# Patient Record
Sex: MALE | Race: WHITE | Employment: FULL TIME | URBAN - METROPOLITAN AREA
[De-identification: names, ages, dates, MRNs, and addresses within clinical notes are randomized per-mention and may not be internally consistent; named-entity substitution may affect disease eponyms.]

---

## 2017-01-24 RX ORDER — IPRATROPIUM/ALBUTEROL SULFATE 20-100 MCG
MIST INHALER (GRAM) INHALATION
Qty: 12 G | Refills: 2 | OUTPATIENT
Start: 2017-01-24

## 2017-01-26 ENCOUNTER — OFFICE VISIT (OUTPATIENT)
Dept: FAMILY MEDICINE CLINIC | Age: 67
End: 2017-01-26

## 2017-01-26 VITALS
HEIGHT: 65 IN | HEART RATE: 78 BPM | BODY MASS INDEX: 27.12 KG/M2 | DIASTOLIC BLOOD PRESSURE: 84 MMHG | SYSTOLIC BLOOD PRESSURE: 126 MMHG | WEIGHT: 162.8 LBS | RESPIRATION RATE: 18 BRPM | TEMPERATURE: 98.7 F

## 2017-01-26 DIAGNOSIS — M54.50 CHRONIC BILATERAL LOW BACK PAIN WITHOUT SCIATICA: ICD-10-CM

## 2017-01-26 DIAGNOSIS — I10 ESSENTIAL HYPERTENSION: ICD-10-CM

## 2017-01-26 DIAGNOSIS — J20.8 ACUTE BRONCHITIS DUE TO OTHER SPECIFIED ORGANISMS: Primary | ICD-10-CM

## 2017-01-26 DIAGNOSIS — G89.29 CHRONIC BILATERAL LOW BACK PAIN WITHOUT SCIATICA: ICD-10-CM

## 2017-01-26 DIAGNOSIS — J43.1 PANLOBULAR EMPHYSEMA (HCC): ICD-10-CM

## 2017-01-26 DIAGNOSIS — Z72.0 TOBACCO ABUSE: ICD-10-CM

## 2017-01-26 PROCEDURE — 99214 OFFICE O/P EST MOD 30 MIN: CPT | Performed by: FAMILY MEDICINE

## 2017-01-26 RX ORDER — CEFDINIR 300 MG/1
300 CAPSULE ORAL 2 TIMES DAILY
Qty: 20 CAPSULE | Refills: 0 | Status: SHIPPED | OUTPATIENT
Start: 2017-01-26 | End: 2017-02-05

## 2017-01-26 RX ORDER — METHYLPREDNISOLONE 4 MG/1
TABLET ORAL
Qty: 1 KIT | Refills: 0 | Status: SHIPPED | OUTPATIENT
Start: 2017-01-26 | End: 2017-01-27 | Stop reason: SDUPTHER

## 2017-01-26 RX ORDER — AMLODIPINE BESYLATE AND ATORVASTATIN CALCIUM 10; 20 MG/1; MG/1
TABLET, FILM COATED ORAL
Qty: 90 TABLET | Refills: 3 | Status: SHIPPED | OUTPATIENT
Start: 2017-01-26 | End: 2017-02-23 | Stop reason: SDUPTHER

## 2017-01-26 RX ORDER — CEFDINIR 300 MG/1
300 CAPSULE ORAL 2 TIMES DAILY
Qty: 20 CAPSULE | Refills: 0 | Status: SHIPPED | OUTPATIENT
Start: 2017-01-26 | End: 2017-01-26

## 2017-01-26 RX ORDER — ALBUTEROL SULFATE 2.5 MG/3ML
2.5 SOLUTION RESPIRATORY (INHALATION) 4 TIMES DAILY
Qty: 120 EACH | Refills: 3 | Status: SHIPPED | OUTPATIENT
Start: 2017-01-26 | End: 2017-02-03 | Stop reason: SDUPTHER

## 2017-01-26 ASSESSMENT — PATIENT HEALTH QUESTIONNAIRE - PHQ9
2. FEELING DOWN, DEPRESSED OR HOPELESS: 1
SUM OF ALL RESPONSES TO PHQ QUESTIONS 1-9: 2
1. LITTLE INTEREST OR PLEASURE IN DOING THINGS: 1
SUM OF ALL RESPONSES TO PHQ9 QUESTIONS 1 & 2: 2

## 2017-01-27 RX ORDER — METHYLPREDNISOLONE 4 MG/1
TABLET ORAL
Qty: 1 KIT | Refills: 0 | Status: SHIPPED | OUTPATIENT
Start: 2017-01-27 | End: 2017-02-02

## 2017-02-03 ENCOUNTER — TELEPHONE (OUTPATIENT)
Dept: FAMILY MEDICINE CLINIC | Age: 67
End: 2017-02-03

## 2017-02-03 RX ORDER — ALBUTEROL SULFATE 2.5 MG/3ML
2.5 SOLUTION RESPIRATORY (INHALATION) 4 TIMES DAILY
Qty: 360 EACH | Refills: 3 | Status: SHIPPED | OUTPATIENT
Start: 2017-02-03

## 2017-02-23 RX ORDER — AMLODIPINE BESYLATE AND ATORVASTATIN CALCIUM 10; 20 MG/1; MG/1
TABLET, FILM COATED ORAL
Qty: 90 TABLET | Refills: 3 | Status: SHIPPED | OUTPATIENT
Start: 2017-02-23

## 2017-06-06 ENCOUNTER — OFFICE VISIT (OUTPATIENT)
Dept: FAMILY MEDICINE CLINIC | Age: 67
End: 2017-06-06

## 2017-06-06 ENCOUNTER — HOSPITAL ENCOUNTER (OUTPATIENT)
Dept: GENERAL RADIOLOGY | Age: 67
Discharge: HOME | End: 2017-06-06
Payer: COMMERCIAL

## 2017-06-06 VITALS
WEIGHT: 164 LBS | RESPIRATION RATE: 16 BRPM | HEIGHT: 65 IN | DIASTOLIC BLOOD PRESSURE: 74 MMHG | TEMPERATURE: 98 F | BODY MASS INDEX: 27.32 KG/M2 | HEART RATE: 80 BPM | SYSTOLIC BLOOD PRESSURE: 136 MMHG

## 2017-06-06 DIAGNOSIS — R07.81 RIB PAIN ON RIGHT SIDE: Primary | ICD-10-CM

## 2017-06-06 DIAGNOSIS — R05.9 COUGH: ICD-10-CM

## 2017-06-06 DIAGNOSIS — Z23 NEED FOR PROPHYLACTIC VACCINATION AGAINST STREPTOCOCCUS PNEUMONIAE (PNEUMOCOCCUS): ICD-10-CM

## 2017-06-06 DIAGNOSIS — R07.81 RIB PAIN ON RIGHT SIDE: ICD-10-CM

## 2017-06-06 PROCEDURE — 90732 PPSV23 VACC 2 YRS+ SUBQ/IM: CPT | Performed by: NURSE PRACTITIONER

## 2017-06-06 PROCEDURE — 99213 OFFICE O/P EST LOW 20 MIN: CPT | Performed by: NURSE PRACTITIONER

## 2017-06-06 PROCEDURE — 90471 IMMUNIZATION ADMIN: CPT | Performed by: NURSE PRACTITIONER

## 2017-06-06 PROCEDURE — 71020 XR CHEST STANDARD TWO VW: CPT

## 2017-06-06 ASSESSMENT — ENCOUNTER SYMPTOMS
SHORTNESS OF BREATH: 1
COUGH: 1

## 2017-09-08 ENCOUNTER — OFFICE VISIT (OUTPATIENT)
Dept: FAMILY MEDICINE CLINIC | Age: 67
End: 2017-09-08

## 2017-09-08 VITALS
WEIGHT: 165 LBS | TEMPERATURE: 96.5 F | HEART RATE: 64 BPM | DIASTOLIC BLOOD PRESSURE: 86 MMHG | BODY MASS INDEX: 27.49 KG/M2 | RESPIRATION RATE: 12 BRPM | SYSTOLIC BLOOD PRESSURE: 136 MMHG | HEIGHT: 65 IN

## 2017-09-08 DIAGNOSIS — I10 ESSENTIAL HYPERTENSION: ICD-10-CM

## 2017-09-08 DIAGNOSIS — G89.29 CHRONIC BILATERAL LOW BACK PAIN WITHOUT SCIATICA: ICD-10-CM

## 2017-09-08 DIAGNOSIS — L98.9 SKIN LESION: ICD-10-CM

## 2017-09-08 DIAGNOSIS — Z72.0 TOBACCO ABUSE: ICD-10-CM

## 2017-09-08 DIAGNOSIS — J43.1 PANLOBULAR EMPHYSEMA (HCC): Primary | ICD-10-CM

## 2017-09-08 DIAGNOSIS — M54.50 CHRONIC BILATERAL LOW BACK PAIN WITHOUT SCIATICA: ICD-10-CM

## 2017-09-08 PROCEDURE — 99214 OFFICE O/P EST MOD 30 MIN: CPT | Performed by: FAMILY MEDICINE

## 2017-12-19 ENCOUNTER — OFFICE VISIT (OUTPATIENT)
Dept: FAMILY MEDICINE CLINIC | Age: 67
End: 2017-12-19

## 2017-12-19 ENCOUNTER — HOSPITAL ENCOUNTER (OUTPATIENT)
Dept: GENERAL RADIOLOGY | Age: 67
Discharge: HOME | End: 2017-12-19
Payer: COMMERCIAL

## 2017-12-19 VITALS
WEIGHT: 162 LBS | HEART RATE: 88 BPM | DIASTOLIC BLOOD PRESSURE: 84 MMHG | RESPIRATION RATE: 18 BRPM | TEMPERATURE: 99.8 F | BODY MASS INDEX: 26.96 KG/M2 | SYSTOLIC BLOOD PRESSURE: 126 MMHG

## 2017-12-19 DIAGNOSIS — J20.9 ACUTE BRONCHITIS, UNSPECIFIED ORGANISM: ICD-10-CM

## 2017-12-19 DIAGNOSIS — J44.9 CHRONIC OBSTRUCTIVE PULMONARY DISEASE, UNSPECIFIED COPD TYPE (HCC): ICD-10-CM

## 2017-12-19 DIAGNOSIS — J44.1 COPD WITH ACUTE EXACERBATION (HCC): ICD-10-CM

## 2017-12-19 DIAGNOSIS — J44.1 COPD WITH ACUTE EXACERBATION (HCC): Primary | ICD-10-CM

## 2017-12-19 PROCEDURE — 71020 XR CHEST STANDARD TWO VW: CPT

## 2017-12-19 PROCEDURE — 99213 OFFICE O/P EST LOW 20 MIN: CPT | Performed by: INTERNAL MEDICINE

## 2017-12-19 RX ORDER — AZITHROMYCIN 250 MG/1
TABLET, FILM COATED ORAL
Qty: 1 PACKET | Refills: 0 | Status: SHIPPED | OUTPATIENT
Start: 2017-12-19

## 2017-12-19 RX ORDER — FLUTICASONE PROPIONATE 110 UG/1
AEROSOL, METERED RESPIRATORY (INHALATION)
Qty: 1 INHALER | Refills: 3 | Status: SHIPPED | OUTPATIENT
Start: 2017-12-19

## 2017-12-19 RX ORDER — PREDNISONE 20 MG/1
40 TABLET ORAL DAILY
Qty: 10 TABLET | Refills: 1 | Status: SHIPPED | OUTPATIENT
Start: 2017-12-19 | End: 2017-12-24

## 2017-12-19 ASSESSMENT — ENCOUNTER SYMPTOMS
WHEEZING: 0
COUGH: 0
TROUBLE SWALLOWING: 0
EYE PAIN: 0
VOICE CHANGE: 0
CONSTIPATION: 0
SORE THROAT: 0
SHORTNESS OF BREATH: 0
ABDOMINAL DISTENTION: 0
RHINORRHEA: 0
EYE ITCHING: 0
DIARRHEA: 0
ABDOMINAL PAIN: 0
COLOR CHANGE: 0
PHOTOPHOBIA: 0
BLOOD IN STOOL: 0
NAUSEA: 0
BACK PAIN: 0
SINUS PRESSURE: 1
EYE DISCHARGE: 0
EYE REDNESS: 0

## 2017-12-19 NOTE — PROGRESS NOTES
Subjective:      Patient ID: Toni Hammer is a 74 y.o. male    Cough x 2 weeks    HPI    Presents with 2 weeks of cough productive of thick green sputum. Assoc chest pain (both sides of the chest) and back pain with coughing and breathing. Pain is dull and rated 2/10, non radiating. Assoc nasal congestion, no sore throat or ear pain. Assoc low grade fever (99.8F today) and chills, assoc SOB with exertion, assoc increased wheezing. Improved with albuterol inhaler and nebulizer. Increased need for inhaler use. No help from coricidin and Nyquil. Same symptoms in sick co workers. Got flu shot on 11/21/17. Past Medical History:   Diagnosis Date    COPD (chronic obstructive pulmonary disease) (HCC)     Herpes zoster with other specified complications(053.79)     History of tobacco abuse     Hyperlipidemia     Hypertension      Past Surgical History:   Procedure Laterality Date    COLONOSCOPY  05/06/16    Celina Styles MD     Social History     Social History    Marital status:      Spouse name: N/A    Number of children: N/A    Years of education: N/A     Occupational History    Not on file. Social History Main Topics    Smoking status: Current Every Day Smoker    Smokeless tobacco: Never Used    Alcohol use Not on file    Drug use: Unknown    Sexual activity: Not on file     Other Topics Concern    Not on file     Social History Narrative    No narrative on file     Family History   Problem Relation Age of Onset    Stroke Mother     Alzheimer's Disease Father      Allergies:  Review of patient's allergies indicates no known allergies.   Patient Active Problem List   Diagnosis    History of tobacco abuse    Herpes zoster with other specified complications    Tobacco abuse    Low back pain    Panlobular emphysema ([facility] [address]    Essential hypertension     Current Outpatient Prescriptions on File Prior to Visit   Medication Sig Dispense Refill    ipratropium (ATROVENT) 0.02 % nebulizer solution Take 2.5 mLs by nebulization 4 times daily ninety day supplies 100 mL 5    albuterol-ipratropium (COMBIVENT RESPIMAT)  MCG/ACT AERS inhaler INHALE 1 PUFF INTO THE LUNGS EVERY 6 HOURS 3 Inhaler 3    amLODIPine-atorvastatatin (CADUET) 10-20 MG per tablet TAKE 1 TABLET DAILY 90 tablet 3    albuterol (PROVENTIL) (2.5 MG/3ML) 0.083% nebulizer solution Take 3 mLs by nebulization 4 times daily 360 each 3    aspirin 81 MG EC tablet Take 81 mg by mouth daily.  Cyanocobalamin (VITAMIN B-12 CR PO) Take  by mouth. No current facility-administered medications on file prior to visit. Review of Systems   Constitutional: Negative for activity change, appetite change, chills, diaphoresis, fatigue, fever and unexpected weight change. HENT: Positive for congestion and sinus pressure. Negative for dental problem, drooling, ear discharge, ear pain, hearing loss, mouth sores, rhinorrhea, sneezing, sore throat, trouble swallowing and voice change. Eyes: Negative for photophobia, pain, discharge, redness and itching. Respiratory: Negative for cough, shortness of breath and wheezing. Gastrointestinal: Negative for abdominal distention, abdominal pain, blood in stool, constipation, diarrhea and nausea. Endocrine: Negative for cold intolerance, heat intolerance, polydipsia and polyuria. Genitourinary: Negative for decreased urine volume, difficulty urinating, dysuria, flank pain, frequency, hematuria and urgency. Musculoskeletal: Negative for arthralgias, back pain and joint swelling. Skin: Negative for color change. Allergic/Immunologic: Negative for environmental allergies and food allergies. Neurological: Negative for dizziness, seizures, syncope, weakness, light-headedness, numbness and headaches. Psychiatric/Behavioral: Negative for agitation, decreased concentration, dysphoric mood and hallucinations. The patient is not nervous/anxious.         Objective:   /84 Pulse 88   Temp 99.8 °F (37.7 °C) (Temporal)   Resp 18   Wt 162 lb (73.5 kg)   BMI 26.96 kg/m²     Physical Exam   Constitutional: He is oriented to person, place, and time. He appears well-developed and well-nourished. No distress. HENT:   Head: Normocephalic and atraumatic. Right Ear: External ear normal.   Left Ear: External ear normal.   Mouth/Throat: Oropharynx is clear and moist. No oropharyngeal exudate. Left submandibular lymphadenopathy, non tender    No TM erythema, no sinus TTP   Eyes: Pupils are equal, round, and reactive to light. Right eye exhibits no discharge. Left eye exhibits no discharge. Cardiovascular: Normal rate, regular rhythm, S1 normal, S2 normal and normal heart sounds. Pulmonary/Chest: Effort normal and breath sounds normal. No accessory muscle usage. No tachypnea. No respiratory distress. He has no wheezes. He has no rales. Abdominal: Soft. Bowel sounds are normal. There is no tenderness. Musculoskeletal: He exhibits no edema. Lymphadenopathy:     He has no cervical adenopathy. Neurological: He is alert and oriented to person, place, and time. Skin: He is not diaphoretic. Assessment:      1. COPD with acute exacerbation (HCC)  XR CHEST STANDARD (2 VW)    azithromycin (ZITHROMAX) 250 MG tablet    predniSONE (DELTASONE) 20 MG tablet    Respiratory Culture   2. Acute bronchitis, unspecified organism  XR CHEST STANDARD (2 VW)    azithromycin (ZITHROMAX) 250 MG tablet    Respiratory Culture   3.  Chronic obstructive pulmonary disease, unspecified COPD type ([address]  fluticasone (FLOVENT HFA) 110 MCG/ACT inhaler         Plan:      Orders Placed This Encounter   Procedures    Respiratory Culture     Standing Status:   Future     Number of Occurrences:   1     Standing Expiration Date:   12/19/2018    XR CHEST STANDARD (2 VW)     Standing Status:   Future     Number of Occurrences:   1     Standing Expiration Date:   12/19/2018     Order Specific Question:   Reason

## 2017-12-22 LAB
CULTURE, RESPIRATORY: NORMAL
GRAM STAIN RESULT: NORMAL

## 2019-03-05 ENCOUNTER — TELEPHONE (OUTPATIENT)
Dept: FAMILY MEDICINE CLINIC | Age: 69
End: 2019-03-05

## 2023-11-15 ENCOUNTER — APPOINTMENT (OUTPATIENT)
Dept: CARDIOLOGY | Facility: HOSPITAL | Age: 73
End: 2023-11-15
Payer: MEDICARE

## 2023-11-15 ENCOUNTER — APPOINTMENT (OUTPATIENT)
Dept: RADIOLOGY | Facility: HOSPITAL | Age: 73
End: 2023-11-15
Payer: MEDICARE

## 2023-11-15 ENCOUNTER — HOSPITAL ENCOUNTER (OUTPATIENT)
Facility: HOSPITAL | Age: 73
Setting detail: OBSERVATION
Discharge: HOME | End: 2023-11-16
Attending: STUDENT IN AN ORGANIZED HEALTH CARE EDUCATION/TRAINING PROGRAM | Admitting: HOSPITALIST
Payer: MEDICARE

## 2023-11-15 ENCOUNTER — APPOINTMENT (OUTPATIENT)
Dept: NEUROLOGY | Facility: HOSPITAL | Age: 73
End: 2023-11-15
Payer: MEDICARE

## 2023-11-15 DIAGNOSIS — J96.01 ACUTE RESPIRATORY FAILURE WITH HYPOXIA (MULTI): ICD-10-CM

## 2023-11-15 DIAGNOSIS — R55 SYNCOPE, UNSPECIFIED SYNCOPE TYPE: ICD-10-CM

## 2023-11-15 DIAGNOSIS — J42 CHRONIC BRONCHITIS, UNSPECIFIED CHRONIC BRONCHITIS TYPE (MULTI): ICD-10-CM

## 2023-11-15 DIAGNOSIS — R56.9 SEIZURE (MULTI): Primary | ICD-10-CM

## 2023-11-15 LAB
ALBUMIN SERPL BCP-MCNC: 4.3 G/DL (ref 3.4–5)
ALP SERPL-CCNC: 114 U/L (ref 33–136)
ALT SERPL W P-5'-P-CCNC: 16 U/L (ref 10–52)
AMPHETAMINES UR QL SCN: ABNORMAL
ANION GAP SERPL CALC-SCNC: 18 MMOL/L (ref 10–20)
APAP SERPL-MCNC: <10 UG/ML
APPEARANCE UR: CLEAR
APTT PPP: 28 SECONDS (ref 27–38)
AST SERPL W P-5'-P-CCNC: 21 U/L (ref 9–39)
BARBITURATES UR QL SCN: ABNORMAL
BASOPHILS # BLD AUTO: 0.09 X10*3/UL (ref 0–0.1)
BASOPHILS NFR BLD AUTO: 0.8 %
BENZODIAZ UR QL SCN: ABNORMAL
BILIRUB SERPL-MCNC: 0.8 MG/DL (ref 0–1.2)
BILIRUB UR STRIP.AUTO-MCNC: NEGATIVE MG/DL
BUN SERPL-MCNC: 14 MG/DL (ref 6–23)
BZE UR QL SCN: ABNORMAL
CALCIUM SERPL-MCNC: 10.3 MG/DL (ref 8.6–10.3)
CANNABINOIDS UR QL SCN: ABNORMAL
CARDIAC TROPONIN I PNL SERPL HS: 6 NG/L (ref 0–20)
CHLORIDE SERPL-SCNC: 92 MMOL/L (ref 98–107)
CK MB CFR SERPL CALC: 1 %MB OF CK
CK MB SERPL-CCNC: 2.7 NG/ML
CK SERPL-CCNC: 220 U/L (ref 0–325)
CO2 SERPL-SCNC: 23 MMOL/L (ref 21–32)
COLOR UR: YELLOW
CREAT SERPL-MCNC: 1.25 MG/DL (ref 0.5–1.3)
EOSINOPHIL # BLD AUTO: 0.26 X10*3/UL (ref 0–0.4)
EOSINOPHIL NFR BLD AUTO: 2.2 %
ERYTHROCYTE [DISTWIDTH] IN BLOOD BY AUTOMATED COUNT: 13.7 % (ref 11.5–14.5)
ETHANOL SERPL-MCNC: <10 MG/DL
FENTANYL+NORFENTANYL UR QL SCN: ABNORMAL
GFR SERPL CREATININE-BSD FRML MDRD: 62 ML/MIN/1.73M*2
GLUCOSE SERPL-MCNC: 129 MG/DL (ref 74–99)
GLUCOSE UR STRIP.AUTO-MCNC: NEGATIVE MG/DL
HCT VFR BLD AUTO: 42.4 % (ref 41–52)
HGB BLD-MCNC: 14.8 G/DL (ref 13.5–17.5)
HOLD SPECIMEN: NORMAL
IMM GRANULOCYTES # BLD AUTO: 0.07 X10*3/UL (ref 0–0.5)
IMM GRANULOCYTES NFR BLD AUTO: 0.6 % (ref 0–0.9)
INR PPP: 1 (ref 0.9–1.1)
KETONES UR STRIP.AUTO-MCNC: NEGATIVE MG/DL
LACTATE SERPL-SCNC: 0.9 MMOL/L (ref 0.4–2)
LEUKOCYTE ESTERASE UR QL STRIP.AUTO: NEGATIVE
LEVETIRACETAM SERPL-MCNC: <2 UG/ML (ref 10–40)
LYMPHOCYTES # BLD AUTO: 1.28 X10*3/UL (ref 0.8–3)
LYMPHOCYTES NFR BLD AUTO: 10.8 %
MAGNESIUM SERPL-MCNC: 1.89 MG/DL (ref 1.6–2.4)
MCH RBC QN AUTO: 29.7 PG (ref 26–34)
MCHC RBC AUTO-ENTMCNC: 34.9 G/DL (ref 32–36)
MCV RBC AUTO: 85 FL (ref 80–100)
MONOCYTES # BLD AUTO: 1.13 X10*3/UL (ref 0.05–0.8)
MONOCYTES NFR BLD AUTO: 9.5 %
NEUTROPHILS # BLD AUTO: 9.01 X10*3/UL (ref 1.6–5.5)
NEUTROPHILS NFR BLD AUTO: 76.1 %
NITRITE UR QL STRIP.AUTO: NEGATIVE
NRBC BLD-RTO: 0 /100 WBCS (ref 0–0)
OPIATES UR QL SCN: ABNORMAL
OXYCODONE+OXYMORPHONE UR QL SCN: ABNORMAL
PCP UR QL SCN: ABNORMAL
PH UR STRIP.AUTO: 7 [PH]
PHOSPHATE SERPL-MCNC: 1.8 MG/DL (ref 2.5–4.9)
PLATELET # BLD AUTO: 446 X10*3/UL (ref 150–450)
POTASSIUM SERPL-SCNC: 3.6 MMOL/L (ref 3.5–5.3)
PROT SERPL-MCNC: 7.1 G/DL (ref 6.4–8.2)
PROT UR STRIP.AUTO-MCNC: NEGATIVE MG/DL
PROTHROMBIN TIME: 10.7 SECONDS (ref 9.8–12.8)
RBC # BLD AUTO: 4.99 X10*6/UL (ref 4.5–5.9)
RBC # UR STRIP.AUTO: NEGATIVE /UL
SALICYLATES SERPL-MCNC: <3 MG/DL
SODIUM SERPL-SCNC: 129 MMOL/L (ref 136–145)
SP GR UR STRIP.AUTO: 1.01
UROBILINOGEN UR STRIP.AUTO-MCNC: <2 MG/DL
WBC # BLD AUTO: 11.8 X10*3/UL (ref 4.4–11.3)

## 2023-11-15 PROCEDURE — 85025 COMPLETE CBC W/AUTO DIFF WBC: CPT | Performed by: STUDENT IN AN ORGANIZED HEALTH CARE EDUCATION/TRAINING PROGRAM

## 2023-11-15 PROCEDURE — 96361 HYDRATE IV INFUSION ADD-ON: CPT | Performed by: STUDENT IN AN ORGANIZED HEALTH CARE EDUCATION/TRAINING PROGRAM

## 2023-11-15 PROCEDURE — 82550 ASSAY OF CK (CPK): CPT

## 2023-11-15 PROCEDURE — 83605 ASSAY OF LACTIC ACID: CPT

## 2023-11-15 PROCEDURE — 95816 EEG AWAKE AND DROWSY: CPT | Performed by: STUDENT IN AN ORGANIZED HEALTH CARE EDUCATION/TRAINING PROGRAM

## 2023-11-15 PROCEDURE — 94640 AIRWAY INHALATION TREATMENT: CPT

## 2023-11-15 PROCEDURE — 2500000001 HC RX 250 WO HCPCS SELF ADMINISTERED DRUGS (ALT 637 FOR MEDICARE OP): Performed by: HOSPITALIST

## 2023-11-15 PROCEDURE — 70450 CT HEAD/BRAIN W/O DYE: CPT | Performed by: RADIOLOGY

## 2023-11-15 PROCEDURE — G0378 HOSPITAL OBSERVATION PER HR: HCPCS

## 2023-11-15 PROCEDURE — 96374 THER/PROPH/DIAG INJ IV PUSH: CPT | Performed by: STUDENT IN AN ORGANIZED HEALTH CARE EDUCATION/TRAINING PROGRAM

## 2023-11-15 PROCEDURE — 82553 CREATINE MB FRACTION: CPT | Mod: ELYLAB

## 2023-11-15 PROCEDURE — 80329 ANALGESICS NON-OPIOID 1 OR 2: CPT | Performed by: STUDENT IN AN ORGANIZED HEALTH CARE EDUCATION/TRAINING PROGRAM

## 2023-11-15 PROCEDURE — 80177 DRUG SCRN QUAN LEVETIRACETAM: CPT | Mod: ELYLAB | Performed by: STUDENT IN AN ORGANIZED HEALTH CARE EDUCATION/TRAINING PROGRAM

## 2023-11-15 PROCEDURE — 36415 COLL VENOUS BLD VENIPUNCTURE: CPT | Performed by: STUDENT IN AN ORGANIZED HEALTH CARE EDUCATION/TRAINING PROGRAM

## 2023-11-15 PROCEDURE — 2500000001 HC RX 250 WO HCPCS SELF ADMINISTERED DRUGS (ALT 637 FOR MEDICARE OP): Performed by: STUDENT IN AN ORGANIZED HEALTH CARE EDUCATION/TRAINING PROGRAM

## 2023-11-15 PROCEDURE — 36415 COLL VENOUS BLD VENIPUNCTURE: CPT

## 2023-11-15 PROCEDURE — 81003 URINALYSIS AUTO W/O SCOPE: CPT | Performed by: STUDENT IN AN ORGANIZED HEALTH CARE EDUCATION/TRAINING PROGRAM

## 2023-11-15 PROCEDURE — 84100 ASSAY OF PHOSPHORUS: CPT | Performed by: STUDENT IN AN ORGANIZED HEALTH CARE EDUCATION/TRAINING PROGRAM

## 2023-11-15 PROCEDURE — 93005 ELECTROCARDIOGRAM TRACING: CPT

## 2023-11-15 PROCEDURE — 80053 COMPREHEN METABOLIC PANEL: CPT | Performed by: STUDENT IN AN ORGANIZED HEALTH CARE EDUCATION/TRAINING PROGRAM

## 2023-11-15 PROCEDURE — 85610 PROTHROMBIN TIME: CPT | Performed by: STUDENT IN AN ORGANIZED HEALTH CARE EDUCATION/TRAINING PROGRAM

## 2023-11-15 PROCEDURE — 96372 THER/PROPH/DIAG INJ SC/IM: CPT

## 2023-11-15 PROCEDURE — 70450 CT HEAD/BRAIN W/O DYE: CPT

## 2023-11-15 PROCEDURE — 84484 ASSAY OF TROPONIN QUANT: CPT | Performed by: STUDENT IN AN ORGANIZED HEALTH CARE EDUCATION/TRAINING PROGRAM

## 2023-11-15 PROCEDURE — 2500000002 HC RX 250 W HCPCS SELF ADMINISTERED DRUGS (ALT 637 FOR MEDICARE OP, ALT 636 FOR OP/ED): Performed by: HOSPITALIST

## 2023-11-15 PROCEDURE — 2500000004 HC RX 250 GENERAL PHARMACY W/ HCPCS (ALT 636 FOR OP/ED): Performed by: STUDENT IN AN ORGANIZED HEALTH CARE EDUCATION/TRAINING PROGRAM

## 2023-11-15 PROCEDURE — 83735 ASSAY OF MAGNESIUM: CPT | Performed by: STUDENT IN AN ORGANIZED HEALTH CARE EDUCATION/TRAINING PROGRAM

## 2023-11-15 PROCEDURE — 99285 EMERGENCY DEPT VISIT HI MDM: CPT | Mod: 25 | Performed by: STUDENT IN AN ORGANIZED HEALTH CARE EDUCATION/TRAINING PROGRAM

## 2023-11-15 PROCEDURE — 99223 1ST HOSP IP/OBS HIGH 75: CPT | Performed by: HOSPITALIST

## 2023-11-15 PROCEDURE — 80307 DRUG TEST PRSMV CHEM ANLYZR: CPT | Performed by: STUDENT IN AN ORGANIZED HEALTH CARE EDUCATION/TRAINING PROGRAM

## 2023-11-15 PROCEDURE — 95816 EEG AWAKE AND DROWSY: CPT

## 2023-11-15 PROCEDURE — 2500000004 HC RX 250 GENERAL PHARMACY W/ HCPCS (ALT 636 FOR OP/ED)

## 2023-11-15 PROCEDURE — 2500000005 HC RX 250 GENERAL PHARMACY W/O HCPCS

## 2023-11-15 RX ORDER — ATORVASTATIN CALCIUM 20 MG/1
40 TABLET, FILM COATED ORAL NIGHTLY
Status: DISCONTINUED | OUTPATIENT
Start: 2023-11-15 | End: 2023-11-16 | Stop reason: HOSPADM

## 2023-11-15 RX ORDER — ACETAMINOPHEN 650 MG/1
650 SUPPOSITORY RECTAL EVERY 4 HOURS PRN
Status: DISCONTINUED | OUTPATIENT
Start: 2023-11-15 | End: 2023-11-16 | Stop reason: HOSPADM

## 2023-11-15 RX ORDER — HYDROCHLOROTHIAZIDE 25 MG/1
25 TABLET ORAL DAILY
COMMUNITY
End: 2023-11-16 | Stop reason: HOSPADM

## 2023-11-15 RX ORDER — VALSARTAN 80 MG/1
80 TABLET ORAL EVERY EVENING
COMMUNITY

## 2023-11-15 RX ORDER — ENOXAPARIN SODIUM 100 MG/ML
40 INJECTION SUBCUTANEOUS EVERY 24 HOURS
Status: DISCONTINUED | OUTPATIENT
Start: 2023-11-15 | End: 2023-11-16 | Stop reason: HOSPADM

## 2023-11-15 RX ORDER — LANOLIN ALCOHOL/MO/W.PET/CERES
100 CREAM (GRAM) TOPICAL DAILY
Status: DISCONTINUED | OUTPATIENT
Start: 2023-11-15 | End: 2023-11-15

## 2023-11-15 RX ORDER — MULTIVIT-MIN/IRON FUM/FOLIC AC 7.5 MG-4
1 TABLET ORAL DAILY
Status: DISCONTINUED | OUTPATIENT
Start: 2023-11-16 | End: 2023-11-16 | Stop reason: HOSPADM

## 2023-11-15 RX ORDER — ASPIRIN 325 MG
325 TABLET ORAL DAILY
COMMUNITY
End: 2023-11-16 | Stop reason: HOSPADM

## 2023-11-15 RX ORDER — VALSARTAN 80 MG/1
80 TABLET ORAL DAILY
Status: DISCONTINUED | OUTPATIENT
Start: 2023-11-16 | End: 2023-11-16 | Stop reason: HOSPADM

## 2023-11-15 RX ORDER — ALBUTEROL SULFATE 90 UG/1
2 AEROSOL, METERED RESPIRATORY (INHALATION) EVERY 6 HOURS PRN
COMMUNITY

## 2023-11-15 RX ORDER — DEXTROSE 50 % IN WATER (D50W) INTRAVENOUS SYRINGE
25
Status: DISCONTINUED | OUTPATIENT
Start: 2023-11-15 | End: 2023-11-15

## 2023-11-15 RX ORDER — INSULIN LISPRO 100 [IU]/ML
0-5 INJECTION, SOLUTION INTRAVENOUS; SUBCUTANEOUS
Status: DISCONTINUED | OUTPATIENT
Start: 2023-11-15 | End: 2023-11-15

## 2023-11-15 RX ORDER — FOLIC ACID 1 MG/1
1 TABLET ORAL DAILY
Status: DISCONTINUED | OUTPATIENT
Start: 2023-11-15 | End: 2023-11-15

## 2023-11-15 RX ORDER — IPRATROPIUM BROMIDE AND ALBUTEROL SULFATE 2.5; .5 MG/3ML; MG/3ML
3 SOLUTION RESPIRATORY (INHALATION) AS NEEDED
Status: DISCONTINUED | OUTPATIENT
Start: 2023-11-15 | End: 2023-11-16 | Stop reason: HOSPADM

## 2023-11-15 RX ORDER — ACETAMINOPHEN 325 MG/1
650 TABLET ORAL EVERY 4 HOURS PRN
Status: DISCONTINUED | OUTPATIENT
Start: 2023-11-15 | End: 2023-11-16 | Stop reason: HOSPADM

## 2023-11-15 RX ORDER — FOLIC ACID 1 MG/1
1 TABLET ORAL DAILY
Status: DISCONTINUED | OUTPATIENT
Start: 2023-11-16 | End: 2023-11-16 | Stop reason: HOSPADM

## 2023-11-15 RX ORDER — IBUPROFEN 200 MG
1 TABLET ORAL DAILY
Status: DISCONTINUED | OUTPATIENT
Start: 2023-12-27 | End: 2023-11-16 | Stop reason: HOSPADM

## 2023-11-15 RX ORDER — IBUPROFEN 200 MG
1 TABLET ORAL DAILY
Status: DISCONTINUED | OUTPATIENT
Start: 2023-11-15 | End: 2023-11-16 | Stop reason: HOSPADM

## 2023-11-15 RX ORDER — ATORVASTATIN CALCIUM 40 MG/1
40 TABLET, FILM COATED ORAL NIGHTLY
COMMUNITY

## 2023-11-15 RX ORDER — PLANT STANOL ESTER 450 MG
1 TABLET ORAL EVERY EVENING
COMMUNITY

## 2023-11-15 RX ORDER — ACETAMINOPHEN 160 MG/5ML
650 SOLUTION ORAL EVERY 4 HOURS PRN
Status: DISCONTINUED | OUTPATIENT
Start: 2023-11-15 | End: 2023-11-16 | Stop reason: HOSPADM

## 2023-11-15 RX ORDER — LANOLIN ALCOHOL/MO/W.PET/CERES
100 CREAM (GRAM) TOPICAL DAILY
Status: DISCONTINUED | OUTPATIENT
Start: 2023-11-15 | End: 2023-11-16 | Stop reason: HOSPADM

## 2023-11-15 RX ORDER — LEVETIRACETAM 500 MG/1
500 TABLET ORAL 2 TIMES DAILY
COMMUNITY
End: 2023-11-16 | Stop reason: HOSPADM

## 2023-11-15 RX ORDER — AMLODIPINE BESYLATE 5 MG/1
10 TABLET ORAL DAILY
Status: DISCONTINUED | OUTPATIENT
Start: 2023-11-16 | End: 2023-11-16 | Stop reason: HOSPADM

## 2023-11-15 RX ORDER — CALCIUM CARBONATE 300MG(750)
400 TABLET,CHEWABLE ORAL DAILY
Status: ON HOLD | COMMUNITY
End: 2023-11-15 | Stop reason: WASHOUT

## 2023-11-15 RX ORDER — SODIUM,POTASSIUM PHOSPHATES 280-250MG
2 POWDER IN PACKET (EA) ORAL ONCE
Status: DISCONTINUED | OUTPATIENT
Start: 2023-11-15 | End: 2023-11-15

## 2023-11-15 RX ORDER — DEXTROSE MONOHYDRATE 100 MG/ML
0.3 INJECTION, SOLUTION INTRAVENOUS ONCE AS NEEDED
Status: DISCONTINUED | OUTPATIENT
Start: 2023-11-15 | End: 2023-11-15

## 2023-11-15 RX ORDER — ONDANSETRON 4 MG/1
4 TABLET, FILM COATED ORAL EVERY 8 HOURS PRN
Status: DISCONTINUED | OUTPATIENT
Start: 2023-11-15 | End: 2023-11-16 | Stop reason: HOSPADM

## 2023-11-15 RX ORDER — SODIUM CHLORIDE 9 MG/ML
75 INJECTION, SOLUTION INTRAVENOUS CONTINUOUS
Status: DISCONTINUED | OUTPATIENT
Start: 2023-11-15 | End: 2023-11-16

## 2023-11-15 RX ORDER — IPRATROPIUM BROMIDE AND ALBUTEROL SULFATE 2.5; .5 MG/3ML; MG/3ML
3 SOLUTION RESPIRATORY (INHALATION)
Status: DISCONTINUED | OUTPATIENT
Start: 2023-11-15 | End: 2023-11-15

## 2023-11-15 RX ORDER — LORAZEPAM 1 MG/1
2 TABLET ORAL EVERY 2 HOUR PRN
Status: DISCONTINUED | OUTPATIENT
Start: 2023-11-15 | End: 2023-11-16 | Stop reason: HOSPADM

## 2023-11-15 RX ORDER — CALCIUM CARBONATE 200(500)MG
1000 TABLET,CHEWABLE ORAL 4 TIMES DAILY PRN
Status: DISCONTINUED | OUTPATIENT
Start: 2023-11-15 | End: 2023-11-16 | Stop reason: HOSPADM

## 2023-11-15 RX ORDER — MAGNESIUM OXIDE 420 MG/1
420 TABLET ORAL DAILY
COMMUNITY
Start: 2023-05-22

## 2023-11-15 RX ORDER — LORAZEPAM 0.5 MG/1
0.5 TABLET ORAL EVERY 2 HOUR PRN
Status: DISCONTINUED | OUTPATIENT
Start: 2023-11-15 | End: 2023-11-16 | Stop reason: HOSPADM

## 2023-11-15 RX ORDER — NICOTINE 7MG/24HR
1 PATCH, TRANSDERMAL 24 HOURS TRANSDERMAL DAILY
Status: DISCONTINUED | OUTPATIENT
Start: 2024-01-10 | End: 2023-11-16 | Stop reason: HOSPADM

## 2023-11-15 RX ORDER — ONDANSETRON HYDROCHLORIDE 2 MG/ML
4 INJECTION, SOLUTION INTRAVENOUS EVERY 8 HOURS PRN
Status: DISCONTINUED | OUTPATIENT
Start: 2023-11-15 | End: 2023-11-16 | Stop reason: HOSPADM

## 2023-11-15 RX ORDER — MULTIVIT-MIN/IRON FUM/FOLIC AC 7.5 MG-4
1 TABLET ORAL DAILY
Status: DISCONTINUED | OUTPATIENT
Start: 2023-11-15 | End: 2023-11-15

## 2023-11-15 RX ORDER — LORAZEPAM 1 MG/1
1 TABLET ORAL EVERY 2 HOUR PRN
Status: DISCONTINUED | OUTPATIENT
Start: 2023-11-15 | End: 2023-11-16 | Stop reason: HOSPADM

## 2023-11-15 RX ORDER — POLYETHYLENE GLYCOL 3350 17 G/17G
17 POWDER, FOR SOLUTION ORAL DAILY
Status: DISCONTINUED | OUTPATIENT
Start: 2023-11-15 | End: 2023-11-16 | Stop reason: HOSPADM

## 2023-11-15 RX ORDER — LANOLIN ALCOHOL/MO/W.PET/CERES
400 CREAM (GRAM) TOPICAL DAILY
Status: DISCONTINUED | OUTPATIENT
Start: 2023-11-16 | End: 2023-11-16 | Stop reason: HOSPADM

## 2023-11-15 RX ORDER — LIDOCAINE HYDROCHLORIDE 10 MG/ML
INJECTION INFILTRATION; PERINEURAL
Status: COMPLETED
Start: 2023-11-15 | End: 2023-11-15

## 2023-11-15 RX ORDER — AMLODIPINE BESYLATE 10 MG/1
10 TABLET ORAL DAILY
COMMUNITY

## 2023-11-15 RX ORDER — TIOTROPIUM BROMIDE 18 UG/1
1 CAPSULE ORAL; RESPIRATORY (INHALATION)
Status: ON HOLD | COMMUNITY
End: 2024-05-06 | Stop reason: ENTERED-IN-ERROR

## 2023-11-15 RX ADMIN — LIDOCAINE HYDROCHLORIDE 10 ML: 10 INJECTION INFILTRATION; PERINEURAL at 13:45

## 2023-11-15 RX ADMIN — SODIUM CHLORIDE 75 ML/HR: 9 INJECTION, SOLUTION INTRAVENOUS at 16:49

## 2023-11-15 RX ADMIN — THIAMINE HCL TAB 100 MG 100 MG: 100 TAB at 11:55

## 2023-11-15 RX ADMIN — ATORVASTATIN CALCIUM 40 MG: 20 TABLET, FILM COATED ORAL at 23:42

## 2023-11-15 RX ADMIN — Medication 1 TABLET: at 11:55

## 2023-11-15 RX ADMIN — SODIUM CHLORIDE, POTASSIUM CHLORIDE, SODIUM LACTATE AND CALCIUM CHLORIDE 1000 ML: 600; 310; 30; 20 INJECTION, SOLUTION INTRAVENOUS at 11:50

## 2023-11-15 RX ADMIN — FOLIC ACID 1 MG: 1 TABLET ORAL at 11:55

## 2023-11-15 RX ADMIN — ANTACID TABLETS 1000 MG: 500 TABLET, CHEWABLE ORAL at 19:02

## 2023-11-15 RX ADMIN — ENOXAPARIN SODIUM 40 MG: 40 INJECTION SUBCUTANEOUS at 16:42

## 2023-11-15 RX ADMIN — ACETAMINOPHEN 650 MG: 325 TABLET ORAL at 16:39

## 2023-11-15 RX ADMIN — IPRATROPIUM BROMIDE AND ALBUTEROL SULFATE 3 ML: 2.5; .5 SOLUTION RESPIRATORY (INHALATION) at 20:23

## 2023-11-15 RX ADMIN — LIDOCAINE HYDROCHLORIDE 10 ML: 10 INJECTION, SOLUTION INFILTRATION; PERINEURAL at 13:45

## 2023-11-15 SDOH — SOCIAL STABILITY: SOCIAL INSECURITY: WERE YOU ABLE TO COMPLETE ALL THE BEHAVIORAL HEALTH SCREENINGS?: YES

## 2023-11-15 SDOH — SOCIAL STABILITY: SOCIAL INSECURITY: DOES ANYONE TRY TO KEEP YOU FROM HAVING/CONTACTING OTHER FRIENDS OR DOING THINGS OUTSIDE YOUR HOME?: NO

## 2023-11-15 SDOH — SOCIAL STABILITY: SOCIAL INSECURITY: DO YOU FEEL ANYONE HAS EXPLOITED OR TAKEN ADVANTAGE OF YOU FINANCIALLY OR OF YOUR PERSONAL PROPERTY?: NO

## 2023-11-15 SDOH — SOCIAL STABILITY: SOCIAL INSECURITY: ABUSE: ADULT

## 2023-11-15 SDOH — SOCIAL STABILITY: SOCIAL INSECURITY: ARE YOU OR HAVE YOU BEEN THREATENED OR ABUSED PHYSICALLY, EMOTIONALLY, OR SEXUALLY BY ANYONE?: NO

## 2023-11-15 SDOH — SOCIAL STABILITY: SOCIAL INSECURITY: DO YOU FEEL UNSAFE GOING BACK TO THE PLACE WHERE YOU ARE LIVING?: NO

## 2023-11-15 SDOH — SOCIAL STABILITY: SOCIAL INSECURITY: HAVE YOU HAD THOUGHTS OF HARMING ANYONE ELSE?: NO

## 2023-11-15 SDOH — SOCIAL STABILITY: SOCIAL INSECURITY: HAS ANYONE EVER THREATENED TO HURT YOUR FAMILY OR YOUR PETS?: NO

## 2023-11-15 SDOH — SOCIAL STABILITY: SOCIAL INSECURITY: ARE THERE ANY APPARENT SIGNS OF INJURIES/BEHAVIORS THAT COULD BE RELATED TO ABUSE/NEGLECT?: NO

## 2023-11-15 ASSESSMENT — ENCOUNTER SYMPTOMS
NAUSEA: 0
LIGHT-HEADEDNESS: 0
BACK PAIN: 0
CHILLS: 0
ABDOMINAL PAIN: 0
CONSTIPATION: 0
CONSTITUTIONAL NEGATIVE: 1
DYSURIA: 0
DIARRHEA: 0
HEADACHES: 1
HEMATURIA: 0
NECK STIFFNESS: 0
FATIGUE: 0
NECK PAIN: 0
FLANK PAIN: 0
PALPITATIONS: 0
CARDIOVASCULAR NEGATIVE: 1
WOUND: 0
DIZZINESS: 0
SHORTNESS OF BREATH: 0
NUMBNESS: 0
MUSCULOSKELETAL NEGATIVE: 1
ARTHRALGIAS: 0
CONFUSION: 1
EYES NEGATIVE: 1
VOMITING: 0
DIAPHORESIS: 0
GASTROINTESTINAL NEGATIVE: 1
RESPIRATORY NEGATIVE: 1

## 2023-11-15 ASSESSMENT — COGNITIVE AND FUNCTIONAL STATUS - GENERAL
DAILY ACTIVITIY SCORE: 24
MOBILITY SCORE: 24
PATIENT BASELINE BEDBOUND: NO

## 2023-11-15 ASSESSMENT — LIFESTYLE VARIABLES
HAVE YOU EVER FELT YOU SHOULD CUT DOWN ON YOUR DRINKING: NO
REASON UNABLE TO ASSESS: NO
AUDIT-C TOTAL SCORE: 6
HOW MANY STANDARD DRINKS CONTAINING ALCOHOL DO YOU HAVE ON A TYPICAL DAY: 3 OR 4
AUDIT TOTAL SCORE: 0
SKIP TO QUESTIONS 9-10: 0
HAVE YOU OR SOMEONE ELSE BEEN INJURED AS A RESULT OF YOUR DRINKING: NO
TREMOR: NOT VISIBLE, BUT CAN BE FELT FINGERTIP TO FINGERTIP
TOTAL SCORE: 4
HAVE PEOPLE ANNOYED YOU BY CRITICIZING YOUR DRINKING: NO
ORIENTATION AND CLOUDING OF SENSORIUM: ORIENTED AND CAN DO SERIAL ADDITIONS
ANXIETY: MILDLY ANXIOUS
EVER FELT BAD OR GUILTY ABOUT YOUR DRINKING: NO
HOW OFTEN DURING THE LAST YEAR HAVE YOU HAD A FEELING OF GUILT OR REMORSE AFTER DRINKING: NEVER
HOW OFTEN DURING THE LAST YEAR HAVE YOU NEEDED AN ALCOHOLIC DRINK FIRST THING IN THE MORNING TO GET YOURSELF GOING AFTER A NIGHT OF HEAVY DRINKING: NEVER
HEADACHE, FULLNESS IN HEAD: MILD
HOW OFTEN DURING THE LAST YEAR HAVE YOU FAILED TO DO WHAT WAS NORMALLY EXPECTED FROM YOU BECAUSE OF DRINKING: NEVER
EVER HAD A DRINK FIRST THING IN THE MORNING TO STEADY YOUR NERVES TO GET RID OF A HANGOVER: NO
PAROXYSMAL SWEATS: NO SWEAT VISIBLE
AUDITORY DISTURBANCES: NOT PRESENT
NAUSEA AND VOMITING: NO NAUSEA AND NO VOMITING
HOW OFTEN DO YOU HAVE A DRINK CONTAINING ALCOHOL: 4 OR MORE TIMES A WEEK
HOW OFTEN DURING THE LAST YEAR HAVE YOU BEEN UNABLE TO REMEMBER WHAT HAPPENED THE NIGHT BEFORE BECAUSE YOU HAD BEEN DRINKING: NEVER
VISUAL DISTURBANCES: NOT PRESENT
HAS A RELATIVE, FRIEND, DOCTOR, OR ANOTHER HEALTH PROFESSIONAL EXPRESSED CONCERN ABOUT YOUR DRINKING OR SUGGESTED YOU CUT DOWN: NO
AUDIT TOTAL SCORE: 6
AGITATION: NORMAL ACTIVITY
HOW OFTEN DO YOU HAVE 6 OR MORE DRINKS ON ONE OCCASION: LESS THAN MONTHLY
AUDIT-C TOTAL SCORE: 6
HOW OFTEN DURING THE LAST YEAR HAVE YOU FOUND THAT YOU WERE NOT ABLE TO STOP DRINKING ONCE YOU HAD STARTED: NEVER

## 2023-11-15 ASSESSMENT — PAIN SCALES - GENERAL
PAINLEVEL_OUTOF10: 4
PAINLEVEL_OUTOF10: 0 - NO PAIN

## 2023-11-15 ASSESSMENT — PATIENT HEALTH QUESTIONNAIRE - PHQ9
SUM OF ALL RESPONSES TO PHQ9 QUESTIONS 1 & 2: 0
2. FEELING DOWN, DEPRESSED OR HOPELESS: NOT AT ALL
1. LITTLE INTEREST OR PLEASURE IN DOING THINGS: NOT AT ALL

## 2023-11-15 ASSESSMENT — COLUMBIA-SUICIDE SEVERITY RATING SCALE - C-SSRS
6. HAVE YOU EVER DONE ANYTHING, STARTED TO DO ANYTHING, OR PREPARED TO DO ANYTHING TO END YOUR LIFE?: NO
2. HAVE YOU ACTUALLY HAD ANY THOUGHTS OF KILLING YOURSELF?: NO
1. IN THE PAST MONTH, HAVE YOU WISHED YOU WERE DEAD OR WISHED YOU COULD GO TO SLEEP AND NOT WAKE UP?: NO

## 2023-11-15 ASSESSMENT — PAIN - FUNCTIONAL ASSESSMENT
PAIN_FUNCTIONAL_ASSESSMENT: 0-10
PAIN_FUNCTIONAL_ASSESSMENT: 0-10

## 2023-11-15 ASSESSMENT — ACTIVITIES OF DAILY LIVING (ADL)
ADEQUATE_TO_COMPLETE_ADL: YES
HEARING - RIGHT EAR: FUNCTIONAL
JUDGMENT_ADEQUATE_SAFELY_COMPLETE_DAILY_ACTIVITIES: YES
TOILETING: INDEPENDENT
DRESSING YOURSELF: INDEPENDENT
FEEDING YOURSELF: INDEPENDENT
HEARING - LEFT EAR: FUNCTIONAL
WALKS IN HOME: INDEPENDENT
BATHING: INDEPENDENT
LACK_OF_TRANSPORTATION: NO
PATIENT'S MEMORY ADEQUATE TO SAFELY COMPLETE DAILY ACTIVITIES?: YES
GROOMING: INDEPENDENT

## 2023-11-15 NOTE — CARE PLAN
The patient's goals for the shift include   no headache and heart burn     The clinical goals for the shift include patient will not have seizure by end of shift    Problem: Psychosocial Needs  Goal: Collaborate with me, my family, and caregiver to identify my specific goals  Recent Flowsheet Documentation  Taken 11/15/2023 1653 by Kaleb Crowley RN  Cultural Requests During Hospitalization: denies  Spiritual Requests During Hospitalization: denies     Problem: Pain  Goal: My pain/discomfort is manageable  Outcome: Progressing     Problem: Safety  Goal: Patient will be injury free during hospitalization  Outcome: Progressing  Goal: I will remain free of falls  Outcome: Progressing

## 2023-11-15 NOTE — H&P
History Of Present Illness  Elton Lilly is a 73 y.o. male presenting with report of seizure. Reports he was at work when he started to get dizzy and passed out. Per coworkers, patient became tense in bilateral upper extremities and had flexion. There was no jerking noted, but was postical when EMS first arrived. Patient was caught before he could fall to the ground. Patient reports he drinks alcohol regularly and his last drink was last night. Says he only had yogurt and coffee this morning and has not had anything to eat or drink since. Patient had reassuring vital signs and a glucose of 129. Reports smoking and occasional cannabis use, denies other drug use.      Past Medical History  HTN, HLD, COPD, CAD    Surgical History: s/p endarterectomy      Social History: pack a day smoker, hx of alcohol abuse    Family History: +CAD      Review of Systems   Constitutional: Negative.  Negative for chills, diaphoresis and fatigue.   HENT: Negative.  Negative for hearing loss and tinnitus.    Eyes: Negative.    Respiratory: Negative.  Negative for shortness of breath.    Cardiovascular: Negative.  Negative for chest pain and palpitations.   Gastrointestinal: Negative.  Negative for abdominal pain, constipation, diarrhea, nausea and vomiting.   Genitourinary: Negative.  Negative for dysuria, flank pain and hematuria.   Musculoskeletal: Negative.  Negative for arthralgias, back pain, neck pain and neck stiffness.   Skin: Negative.  Negative for rash and wound.   Neurological:  Positive for headaches. Negative for dizziness, light-headedness and numbness.   Psychiatric/Behavioral:  Positive for confusion (Mild).         Physical Exam  Constitutional:       Appearance: Normal appearance. He is normal weight.   HENT:      Head: Normocephalic.      Right Ear: Tympanic membrane normal.      Left Ear: Tympanic membrane normal.      Nose: Nose normal.      Mouth/Throat:      Mouth: Mucous membranes are moist.      Pharynx:  "Oropharynx is clear.   Eyes:      General: No visual field deficit.     Extraocular Movements: Extraocular movements intact.      Pupils: Pupils are equal, round, and reactive to light.   Cardiovascular:      Rate and Rhythm: Normal rate and regular rhythm.      Pulses: Normal pulses.      Heart sounds: Normal heart sounds.   Pulmonary:      Effort: Pulmonary effort is normal.      Breath sounds: Normal breath sounds.   Abdominal:      General: Abdomen is flat. Bowel sounds are normal.      Palpations: Abdomen is soft.      Tenderness: There is no abdominal tenderness.   Musculoskeletal:         General: No tenderness. Normal range of motion.      Cervical back: Normal range of motion. No rigidity or tenderness.   Skin:     General: Skin is warm and dry.      Capillary Refill: Capillary refill takes less than 2 seconds.      Findings: No bruising or lesion.   Neurological:      Mental Status: He is alert and oriented to person, place, and time. Mental status is at baseline.      GCS: GCS eye subscore is 4. GCS verbal subscore is 5. GCS motor subscore is 6.      Cranial Nerves: No facial asymmetry.      Sensory: Sensation is intact.      Motor: Motor function is intact. No weakness, tremor or pronator drift.   Psychiatric:         Mood and Affect: Mood normal.          Last Recorded Vitals  Blood pressure 132/67, pulse 88, temperature 36.7 °C (98.1 °F), resp. rate 18, height 1.778 m (5' 10\"), weight 80 kg (176 lb 5.9 oz), SpO2 98 %.    Relevant Results  Results for orders placed or performed during the hospital encounter of 11/15/23 (from the past 24 hour(s))   CBC and Auto Differential   Result Value Ref Range    WBC 11.8 (H) 4.4 - 11.3 x10*3/uL    nRBC 0.0 0.0 - 0.0 /100 WBCs    RBC 4.99 4.50 - 5.90 x10*6/uL    Hemoglobin 14.8 13.5 - 17.5 g/dL    Hematocrit 42.4 41.0 - 52.0 %    MCV 85 80 - 100 fL    MCH 29.7 26.0 - 34.0 pg    MCHC 34.9 32.0 - 36.0 g/dL    RDW 13.7 11.5 - 14.5 %    Platelets 446 150 - 450 x10*3/uL "    Neutrophils % 76.1 40.0 - 80.0 %    Immature Granulocytes %, Automated 0.6 0.0 - 0.9 %    Lymphocytes % 10.8 13.0 - 44.0 %    Monocytes % 9.5 2.0 - 10.0 %    Eosinophils % 2.2 0.0 - 6.0 %    Basophils % 0.8 0.0 - 2.0 %    Neutrophils Absolute 9.01 (H) 1.60 - 5.50 x10*3/uL    Immature Granulocytes Absolute, Automated 0.07 0.00 - 0.50 x10*3/uL    Lymphocytes Absolute 1.28 0.80 - 3.00 x10*3/uL    Monocytes Absolute 1.13 (H) 0.05 - 0.80 x10*3/uL    Eosinophils Absolute 0.26 0.00 - 0.40 x10*3/uL    Basophils Absolute 0.09 0.00 - 0.10 x10*3/uL   Phosphorus   Result Value Ref Range    Phosphorus 1.8 (L) 2.5 - 4.9 mg/dL   Magnesium   Result Value Ref Range    Magnesium 1.89 1.60 - 2.40 mg/dL   Comprehensive metabolic panel   Result Value Ref Range    Glucose 129 (H) 74 - 99 mg/dL    Sodium 129 (L) 136 - 145 mmol/L    Potassium 3.6 3.5 - 5.3 mmol/L    Chloride 92 (L) 98 - 107 mmol/L    Bicarbonate 23 21 - 32 mmol/L    Anion Gap 18 10 - 20 mmol/L    Urea Nitrogen 14 6 - 23 mg/dL    Creatinine 1.25 0.50 - 1.30 mg/dL    eGFR 62 >60 mL/min/1.73m*2    Calcium 10.3 8.6 - 10.3 mg/dL    Albumin 4.3 3.4 - 5.0 g/dL    Alkaline Phosphatase 114 33 - 136 U/L    Total Protein 7.1 6.4 - 8.2 g/dL    AST 21 9 - 39 U/L    Bilirubin, Total 0.8 0.0 - 1.2 mg/dL    ALT 16 10 - 52 U/L   Troponin I, High Sensitivity   Result Value Ref Range    Troponin I, High Sensitivity 6 0 - 20 ng/L   Acute Toxicology Panel, Blood   Result Value Ref Range    Acetaminophen <10.0 10.0 - 30.0 ug/mL    Salicylate  <3 4 - 20 mg/dL    Alcohol <10 <=10 mg/dL   Coagulation Screen   Result Value Ref Range    Protime 10.7 9.8 - 12.8 seconds    INR 1.0 0.9 - 1.1    aPTT 28 27 - 38 seconds   DRUG SCREEN,URINE   Result Value Ref Range    Amphetamine Screen, Urine Presumptive Negative Presumptive Negative    Barbiturate Screen, Urine Presumptive Negative Presumptive Negative    Benzodiazepines Screen, Urine Presumptive Negative Presumptive Negative    Cannabinoid Screen,  Urine Presumptive Positive (A) Presumptive Negative    Cocaine Metabolite Screen, Urine Presumptive Negative Presumptive Negative    Fentanyl Screen, Urine Presumptive Negative Presumptive Negative    Opiate Screen, Urine Presumptive Negative Presumptive Negative    Oxycodone Screen, Urine Presumptive Negative Presumptive Negative    PCP Screen, Urine Presumptive Negative Presumptive Negative   Urinalysis with Reflex Microscopic and Culture   Result Value Ref Range    Color, Urine Yellow Straw, Yellow    Appearance, Urine Clear Clear    Specific Gravity, Urine 1.009 1.005 - 1.035    pH, Urine 7.0 5.0, 5.5, 6.0, 6.5, 7.0, 7.5, 8.0    Protein, Urine NEGATIVE NEGATIVE mg/dL    Glucose, Urine NEGATIVE NEGATIVE mg/dL    Blood, Urine NEGATIVE NEGATIVE    Ketones, Urine NEGATIVE NEGATIVE mg/dL    Bilirubin, Urine NEGATIVE NEGATIVE    Urobilinogen, Urine <2.0 <2.0 mg/dL    Nitrite, Urine NEGATIVE NEGATIVE    Leukocyte Esterase, Urine NEGATIVE NEGATIVE     Scheduled medications  folic acid, 1 mg, oral, Daily  lidocaine, , ,   multivitamin with minerals, 1 tablet, oral, Daily  potassium, sodium phosphates, 2 packet, oral, Once  thiamine, 100 mg, oral, Daily      Continuous medications     PRN medications  PRN medications: lidocaine      Assessment/Plan   Active Problems:  There are no active Hospital Problems.    Assessment: 73 year old male presented and admitted possible seizure vs vasovagal syncope    Tele, orthostatic vitals, and seizure precautions  Chart review notes was on keppra at one point but patient does not remember, he states he currently is not taking   Neurology consulted-appreciate recommendations.  MRI without contrast ordered, EEG ordered   Lactate ordered, CK, IV fluids     Alcohol Abuse  CIWA protocol, folate, and thiamine.  Monitor for withdrawal    Hx COPD: prn nebs     CAD/HTN: continue home meds    DVT ppx: lovenox   Vince Quintana MD

## 2023-11-15 NOTE — ED PROVIDER NOTES
HPI: The patient is a 73-year-old man with history of alcohol use as well as recent admission for chest pain with a negative heart cath and hospitalization earlier this year where he fell and had intracranial bleeding and was discharged on Keppra who presents to the Emergency Department with a chief complaint of report of a seizure.  Patient is unable to provide much of a history initially and most of the history is per EMS.  EMS reports that they were called by coworkers after the patient became tensed in his bilateral upper extremities and was quite flexed.  Patient did not fall to the ground but was gently placed on the ground.  No jerking was noted but the patient was very postictal for EMS when they first arrived and less so upon arrival here in the ER.  No report of trauma.  Patient had normal glucose and had reassuring vital signs.  Per EMS, coworkers reported the patient was acting normally prior to this not complaining of anything.  Patient reports no aches or pains currently but is still postictal.  He reports he drinks alcohol daily but has never had withdrawal.  Denies any other substance use and reports no history of seizure initially.     PAST MEDICAL HISTORY:  as per HPI  ALLERGIES:  as per HPI  MEDICATIONS:  as per HPI  FAMILY HISTORY: as per HPI  SURGICAL HISTORY: as per HPI  SOCIAL HISTORY: as per HPI     PHYSICAL EXAM:  VITAL SIGNS: Nursing notes reviewed.  GENERAL:  Alert and interactive  EYES:   Eyes track.  ENT:  Airway patent.  RESPIRATORY:  Nonlabored breathing.  CARDIOVASCULAR: Tachycardic [Regular rhythm.]  GASTROINTESTINAL:  No distension.  MUSCULOSKELETAL:  No deformity.   NEUROLOGICAL:  Awake.  Tracks with eyes, PERRL, EOMI, equal sensation in V1-V3, no facial droop, sounds equal in both ears, 5/5 w/ strength shoulder raise, tongue protrudes at midline, soft palate raises symmetrically 5/5 strength in UE and LE, no deficit with light touch, normal finger to nose and heel to shin.  Slight  confusion  SKIN:  Dry.  HEAD: Nontender, atraumatic  NECK: No midline C-spine tender step-offs deformities.  No meningismus.  Full range of motion.        MEDICAL DECISION MAKING (MDM):       DIAGNOSTIC STUDIES  Labs: Acute tox panel, CBC, coag screen, CMP, drug screen, Keppra level, magnesium level, phosphorus level, point-of-care glucose, troponin, UA  Radiology: CT head     EKG 1140  Per my interpretation:  Electrocardiogram ECG  RATE: 106  RHYTHM: [Sinus]  AXIS: [Normal]  INTERVALS: [Normal]  ST-T WAVE CHANGES: [Normal]  ABNORMALITIES/COMPARISON: Similar to most recent EKG on July 2, 2023.  PVC present today.     REVIEW OF OLD RECORDS: Reviewed the DC summary from 11/30/2022 as well as 703 2023    Procedure: lumbar puncture  Performed by: Richard Smith MD  Indication: Concern for encephalitis   risks, benefits, alternatives were discussed - patient provided informed verbal consent to proceed.  The patient was placed in a seated position and the back was prepped with Betadine and sterile drapes.  Local anesthesia was provided with lidocaine infiltration.  Using sterile technique attempt was made to enter both at the L3-L4 and L4-L5 interspace  2 total attempt(s) required.  I was unable to obtain CSF.  Procedure was well tolerated.  There were no immediate complications.     SUMMARY:  The patient is admitted to the Emergency Department for evaluation of above. Complete history and physical examination was performed by me.  Patient presents after what sounds like a seizure.  Given that the upper extremities were tensed but there is no report of involving the lower extremities, subsequent partial seizure and he was initially more postictal and seems less postictal now.  No signs of trauma no report of trauma.  Reassuringly and no meningismus point away from meningitis.  Blood work EKG and urine were obtained, seizure precautions were placed and a CT head was ordered.  EKG did not point towards dysrhythmia or  towards acute ischemia.  Patient was given a liter of fluids given his slightly tachycardic.  No signs of severe alcohol withdrawal at this point but given his history, I did give him thiamine folate and multivitamin and placed on the CIWA.  Blood work showed a slight leukocytosis but otherwise was very reassuring.  No evidence of coingestions.  CT head did not show any acute pathology to explain his presentation. Given the uncertain etiology around the seizure, I did want to pursue a lumbar puncture on the off chance that this was meningitis or encephalitis although my suspicion was low.  This was all discussed with the patient and he agreed that we should pursue this.  LP was attempted as described above but no CSF was obtained.  Given my low suspicion for meningitis encephalitis, I held off on antibiotics and antivirals but believe the patient should be admitted for further evaluation care given he would likely require IR lumbar puncture for further evaluation of new seizure as well as likely an MRI.  Low suspicion for dural venous thrombus.  Given that he only had 1 seizure and is back to baseline, I did not put him on an antiepileptic and will defer this to neurology's recommendations.     DIAGNOSIS:    Seizure     DISPOSITION:    1) admission       Richard Smith MD  11/15/23 [id number]

## 2023-11-16 ENCOUNTER — APPOINTMENT (OUTPATIENT)
Dept: RADIOLOGY | Facility: HOSPITAL | Age: 73
End: 2023-11-16
Payer: MEDICARE

## 2023-11-16 VITALS
BODY MASS INDEX: 21.68 KG/M2 | HEIGHT: 70 IN | WEIGHT: 151.46 LBS | HEART RATE: 71 BPM | RESPIRATION RATE: 16 BRPM | DIASTOLIC BLOOD PRESSURE: 63 MMHG | SYSTOLIC BLOOD PRESSURE: 108 MMHG | TEMPERATURE: 98.1 F | OXYGEN SATURATION: 90 %

## 2023-11-16 LAB
ANION GAP SERPL CALC-SCNC: 10 MMOL/L (ref 10–20)
BUN SERPL-MCNC: 11 MG/DL (ref 6–23)
CALCIUM SERPL-MCNC: 9.1 MG/DL (ref 8.6–10.3)
CHLORIDE SERPL-SCNC: 97 MMOL/L (ref 98–107)
CO2 SERPL-SCNC: 28 MMOL/L (ref 21–32)
CREAT SERPL-MCNC: 1.07 MG/DL (ref 0.5–1.3)
ERYTHROCYTE [DISTWIDTH] IN BLOOD BY AUTOMATED COUNT: 13.8 % (ref 11.5–14.5)
GFR SERPL CREATININE-BSD FRML MDRD: 74 ML/MIN/1.73M*2
GLUCOSE SERPL-MCNC: 94 MG/DL (ref 74–99)
HCT VFR BLD AUTO: 39.9 % (ref 41–52)
HGB BLD-MCNC: 13.7 G/DL (ref 13.5–17.5)
HOLD SPECIMEN: NORMAL
MAGNESIUM SERPL-MCNC: 1.87 MG/DL (ref 1.6–2.4)
MCH RBC QN AUTO: 29.6 PG (ref 26–34)
MCHC RBC AUTO-ENTMCNC: 34.3 G/DL (ref 32–36)
MCV RBC AUTO: 86 FL (ref 80–100)
NRBC BLD-RTO: 0 /100 WBCS (ref 0–0)
PLATELET # BLD AUTO: 362 X10*3/UL (ref 150–450)
POTASSIUM SERPL-SCNC: 3.3 MMOL/L (ref 3.5–5.3)
RBC # BLD AUTO: 4.63 X10*6/UL (ref 4.5–5.9)
SODIUM SERPL-SCNC: 132 MMOL/L (ref 136–145)
WBC # BLD AUTO: 9.5 X10*3/UL (ref 4.4–11.3)

## 2023-11-16 PROCEDURE — 2500000002 HC RX 250 W HCPCS SELF ADMINISTERED DRUGS (ALT 637 FOR MEDICARE OP, ALT 636 FOR OP/ED): Performed by: HOSPITALIST

## 2023-11-16 PROCEDURE — 36415 COLL VENOUS BLD VENIPUNCTURE: CPT | Performed by: HOSPITALIST

## 2023-11-16 PROCEDURE — 71045 X-RAY EXAM CHEST 1 VIEW: CPT | Performed by: RADIOLOGY

## 2023-11-16 PROCEDURE — 85027 COMPLETE CBC AUTOMATED: CPT | Performed by: HOSPITALIST

## 2023-11-16 PROCEDURE — 80048 BASIC METABOLIC PNL TOTAL CA: CPT | Performed by: HOSPITALIST

## 2023-11-16 PROCEDURE — 2550000001 HC RX 255 CONTRASTS: Performed by: HOSPITALIST

## 2023-11-16 PROCEDURE — 96374 THER/PROPH/DIAG INJ IV PUSH: CPT | Performed by: STUDENT IN AN ORGANIZED HEALTH CARE EDUCATION/TRAINING PROGRAM

## 2023-11-16 PROCEDURE — 71045 X-RAY EXAM CHEST 1 VIEW: CPT

## 2023-11-16 PROCEDURE — 96361 HYDRATE IV INFUSION ADD-ON: CPT | Performed by: STUDENT IN AN ORGANIZED HEALTH CARE EDUCATION/TRAINING PROGRAM

## 2023-11-16 PROCEDURE — 83735 ASSAY OF MAGNESIUM: CPT | Performed by: HOSPITALIST

## 2023-11-16 PROCEDURE — 2500000001 HC RX 250 WO HCPCS SELF ADMINISTERED DRUGS (ALT 637 FOR MEDICARE OP): Performed by: HOSPITALIST

## 2023-11-16 PROCEDURE — 70553 MRI BRAIN STEM W/O & W/DYE: CPT

## 2023-11-16 PROCEDURE — 99239 HOSP IP/OBS DSCHRG MGMT >30: CPT | Performed by: HOSPITALIST

## 2023-11-16 PROCEDURE — 99222 1ST HOSP IP/OBS MODERATE 55: CPT | Performed by: STUDENT IN AN ORGANIZED HEALTH CARE EDUCATION/TRAINING PROGRAM

## 2023-11-16 PROCEDURE — 2500000001 HC RX 250 WO HCPCS SELF ADMINISTERED DRUGS (ALT 637 FOR MEDICARE OP)

## 2023-11-16 PROCEDURE — G0378 HOSPITAL OBSERVATION PER HR: HCPCS

## 2023-11-16 PROCEDURE — A9575 INJ GADOTERATE MEGLUMI 0.1ML: HCPCS | Performed by: HOSPITALIST

## 2023-11-16 PROCEDURE — 2500000004 HC RX 250 GENERAL PHARMACY W/ HCPCS (ALT 636 FOR OP/ED)

## 2023-11-16 PROCEDURE — 2500000004 HC RX 250 GENERAL PHARMACY W/ HCPCS (ALT 636 FOR OP/ED): Performed by: HOSPITALIST

## 2023-11-16 PROCEDURE — 70553 MRI BRAIN STEM W/O & W/DYE: CPT | Performed by: RADIOLOGY

## 2023-11-16 PROCEDURE — 94640 AIRWAY INHALATION TREATMENT: CPT

## 2023-11-16 RX ORDER — PREDNISONE 20 MG/1
50 TABLET ORAL DAILY
Status: DISCONTINUED | OUTPATIENT
Start: 2023-11-16 | End: 2023-11-16 | Stop reason: HOSPADM

## 2023-11-16 RX ORDER — LIDOCAINE HYDROCHLORIDE 10 MG/ML
10 INJECTION INFILTRATION; PERINEURAL ONCE
Status: COMPLETED | OUTPATIENT
Start: 2023-11-15 | End: 2023-11-15

## 2023-11-16 RX ORDER — POTASSIUM CHLORIDE 20 MEQ/1
40 TABLET, EXTENDED RELEASE ORAL ONCE
Status: COMPLETED | OUTPATIENT
Start: 2023-11-16 | End: 2023-11-16

## 2023-11-16 RX ORDER — GADOTERATE MEGLUMINE 376.9 MG/ML
0.2 INJECTION INTRAVENOUS
Status: COMPLETED | OUTPATIENT
Start: 2023-11-16 | End: 2023-11-16

## 2023-11-16 RX ORDER — PREDNISONE 50 MG/1
50 TABLET ORAL DAILY
Qty: 4 TABLET | Refills: 0 | Status: SHIPPED | OUTPATIENT
Start: 2023-11-16 | End: 2023-11-20

## 2023-11-16 RX ORDER — PREDNISOLONE 5 MG/1
50 TABLET ORAL DAILY
Status: DISCONTINUED | OUTPATIENT
Start: 2023-11-16 | End: 2023-11-16

## 2023-11-16 RX ADMIN — POTASSIUM CHLORIDE 40 MEQ: 1500 TABLET, EXTENDED RELEASE ORAL at 08:44

## 2023-11-16 RX ADMIN — IPRATROPIUM BROMIDE AND ALBUTEROL SULFATE 3 ML: 2.5; .5 SOLUTION RESPIRATORY (INHALATION) at 11:56

## 2023-11-16 RX ADMIN — TIOTROPIUM BROMIDE INHALATION SPRAY 2 PUFF: 3.12 SPRAY, METERED RESPIRATORY (INHALATION) at 07:22

## 2023-11-16 RX ADMIN — GADOTERATE MEGLUMINE 13.5 ML: 376.9 INJECTION INTRAVENOUS at 12:55

## 2023-11-16 RX ADMIN — MAGNESIUM OXIDE 400 MG (241.3 MG MAGNESIUM) TABLET 400 MG: TABLET at 08:35

## 2023-11-16 RX ADMIN — Medication 100 MG: at 08:35

## 2023-11-16 RX ADMIN — VALSARTAN 80 MG: 80 TABLET, FILM COATED ORAL at 08:35

## 2023-11-16 RX ADMIN — SODIUM CHLORIDE 75 ML/HR: 9 INJECTION, SOLUTION INTRAVENOUS at 04:56

## 2023-11-16 RX ADMIN — MULTIPLE VITAMINS W/ MINERALS TAB 1 TABLET: TAB at 08:35

## 2023-11-16 RX ADMIN — FOLIC ACID 1 MG: 1 TABLET ORAL at 08:35

## 2023-11-16 RX ADMIN — AMLODIPINE BESYLATE 10 MG: 5 TABLET ORAL at 08:35

## 2023-11-16 ASSESSMENT — PAIN SCALES - WONG BAKER: WONGBAKER_NUMERICALRESPONSE: NO HURT

## 2023-11-16 ASSESSMENT — PAIN SCALES - GENERAL: PAINLEVEL_OUTOF10: 0 - NO PAIN

## 2023-11-16 NOTE — DISCHARGE SUMMARY
Discharge Diagnosis  Syncope        Discharge Meds     Your medication list        START taking these medications        Instructions Last Dose Given Next Dose Due   predniSONE 50 mg tablet  Commonly known as: Deltasone      Take 1 tablet (50 mg) by mouth once daily for 4 days.              CONTINUE taking these medications        Instructions Last Dose Given Next Dose Due   albuterol 90 mcg/actuation inhaler           amLODIPine 10 mg tablet  Commonly known as: Norvasc           atorvastatin 40 mg tablet  Commonly known as: Lipitor           magnesium oxide 420 mg tablet  Commonly known as: Mag-Ox           potassium gluconate 550 mg (90 mg) tablet           tiotropium 18 mcg inhalation capsule  Commonly known as: Spiriva           valsartan 80 mg tablet  Commonly known as: Diovan                  STOP taking these medications      aspirin 325 mg tablet        hydroCHLOROthiazide 25 mg tablet  Commonly known as: HYDRODiuril        levETIRAcetam 500 mg tablet  Commonly known as: Keppra                  Where to Get Your Medications        These medications were sent to [facility] - [place], [place] - [address]   [address] , [facility] [address]      Phone: [phone]   predniSONE 50 mg tablet         Test Results Pending At Discharge  Pending Labs       No current pending labs.          Hospital Course  Elton Lilly is a 74 yo male who [place] the [facility] [place] for possible seizure vs syncope. Admitted to floor for further evaluation. Started patient on telemetry, seizure precautions, and IV fluids. Neurology was consulted. EEG and MRI with and without contrast pending currently. Orthostatic vitals negative this morning. Labs were unremarkable and patient feels well today. Notable wheezing was heard on lung auscultation so CXR was ordered. Showed continued linear atelectasis fibrosis of bilateral lung bases without new findings since 7/2/23.     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  - CT head: No evidence of acute cortical infarct or intracranial hemorrhage   - MRI brain: no acute intracranial pathology, noted a partially empty sella that is a nonspecific finding associated with intracranial hypertension   - ortho statics negative   - EEG pending, follow up outpatient   - discontinue Keppra   - [facility]  - per neuro, recommend outpatient cardiac monitor which ordered      Hx alcohol abuse, tobacco use  - continue magnesium, folate and thiamine     CAD, HTN  - discontinue aspirin   - discontinue hydrochlorothiazide   - continue statin, amlodipine, valsartan  - continue potassium gluconate     Hx of COPD  - continue albuterol as needed, continue spiriva   - significant wheezing on exam, CXR shows continued linear atelectasis fibrosis of bilateral lung bases without new findings since 7/2/23  - start prednisone 50 mg daily for 4 days  - will need 2L oxygen at home      Hypokalemia: repleted     Greater than 30 minutes of clinical time spent caring for this patient     Pertinent Physical Exam At Time of Discharge  Physical Exam  Constitutional:       General: He is not in acute distress.     Appearance: Normal appearance. He is not ill-appearing.      Interventions: Nasal cannula in place.   HENT:      Head: Normocephalic and atraumatic.   Eyes:      Extraocular Movements: Extraocular movements intact.      Conjunctiva/sclera: Conjunctivae normal.      Pupils: Pupils are equal, round, and reactive to light.   Cardiovascular:      Rate and Rhythm: Normal rate and regular  rhythm.      Pulses: Normal pulses.           Radial pulses are 2+ on the right side and 2+ on the left side.        Dorsalis pedis pulses are 2+ on the right side and 2+ on the left side.      Heart sounds: Normal heart sounds.   Pulmonary:      Effort: Pulmonary effort is normal. No accessory muscle usage or respiratory distress.      Breath sounds: Examination of the right-upper field reveals wheezing. Examination of the left-upper field reveals wheezing. Examination of the right-middle field reveals wheezing. Examination of the left-middle field reveals wheezing. Examination of the right-lower field reveals wheezing. Examination of the left-lower field reveals wheezing. Wheezing present.   Abdominal:      General: Abdomen is flat. Bowel sounds are normal.      Palpations: Abdomen is soft. There is no mass.      Tenderness: There is no abdominal tenderness.   Musculoskeletal:      Cervical back: Normal range of motion and neck supple.      Right lower leg: No edema.      Left lower leg: No edema.   Skin:     General: Skin is warm and dry.   Neurological:      Mental Status: He is alert.      Sensory: Sensation is intact.      Motor: Motor function is intact. No weakness.   Psychiatric:         Mood and Affect: Mood normal.         Behavior: Behavior normal.         Thought Content: Thought content normal.         Judgment: Judgment normal.     Outpatient Follow-Up  No future appointments.

## 2023-11-16 NOTE — CONSULTS
Inpatient consult to Neurology  Consult performed by: Yuli Gray MD  Consult ordered by: Katia Lerner PA-C          History Of Present Illness  Elton Lilly is a 73 y.o. male presenting with episode of LOC.    He was at work yesterday, sitting down, when he felt suddenly lightheaded and dizzy. He went to stand up and was witnessed to lose consciosuness. Coworkers caught him before he fell to the ground. Per ED documentation, he was noted to be tense in his upper and lower extremities but with no shaking movements. He denies tongue trauma, bowel or bladder incontinence. He was slightly confused after but recalls being in the ambulance and felt back to his baseline in the ED with mild residual dizziness and lightheadedness. BG was 129 per EMS.     He drank 3 beers the night prior, typical quantity for him. He drank his usual 2 cups of coffee that morning and ate a yogurt which was also his typical morning routine.    He has a history of syncopal event 1 year ago resulting in head trauma, traumatic bilateral temproal ICH and left temporal contusion for which he was on Keppra for seizure prophlyaxis for 7 days. He has had no further seizures since then. He endorses occasional positional dizziness upon standing. He never had a cardiac monitor after his syncopal event. 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     He denies a history of seizures. He denies a history of head trauma prior to the initial syncopal event.     Past Medical History  No past medical history on file.  Surgical History  No past surgical history on file.  Social History  Social History     Tobacco Use    Smoking status: Every Day     Packs/day: 1.00     Years: 55.00     Additional pack years: 0.00     Total pack years: 55.00     Types: Cigarettes    Smokeless tobacco: Never   Substance Use Topics    Alcohol use: Yes     Alcohol/week: 5.0 standard drinks of alcohol      Types: 5 Cans of beer per week     Allergies  Patient has no known allergies.  Medications Prior to Admission   Medication Sig Dispense Refill Last Dose    magnesium oxide (Mag-Ox) 420 mg tablet Take 1 tablet (420 mg) by mouth.   11/15/2023    albuterol 90 mcg/actuation inhaler Inhale 2 puffs every 6 hours if needed for wheezing.   11/15/2023    amLODIPine (Norvasc) 10 mg tablet Take 1 tablet (10 mg) by mouth once daily.   11/15/2023    aspirin 325 mg tablet Take 1 tablet (325 mg) by mouth once daily.   11/15/2023    atorvastatin (Lipitor) 40 mg tablet Take 1 tablet (40 mg) by mouth once daily.   11/14/2023    hydroCHLOROthiazide (HYDRODiuril) 25 mg tablet Take 1 tablet (25 mg) by mouth once daily.   11/15/2023    levETIRAcetam (Keppra) 500 mg tablet Take 1 tablet (500 mg) by mouth 2 times a day.   More than a month    potassium gluconate 550 mg (90 mg) tablet Take 1 tablet by mouth once daily.   11/15/2023    tiotropium (Spiriva) 18 mcg inhalation capsule Place 1 capsule (18 mcg) into inhaler and inhale once daily.   11/15/2023    valsartan (Diovan) 80 mg tablet Take 1 tablet (80 mg) by mouth once daily.   11/15/2023       Review of Systems  Neurological Exam  Mental Status  Awake, alert and oriented to person, place and time. Speech is normal.    Cranial Nerves  CN II: Visual fields full to confrontation.  CN III, IV, VI: Extraocular movements intact bilaterally.  CN V: Facial sensation is normal.  CN VII: Full and symmetric facial movement.  CN VIII: Hearing is normal.  CN IX, X: Palate elevates symmetrically  CN XI: Shoulder shrug strength is normal.  CN XII: Tongue midline without atrophy or fasciculations.    Motor   Strength is 5/5 throughout all four extremities.    Sensory  Light touch is normal in upper and lower extremities.     Coordination  Right: Finger-to-nose normal.Left: Finger-to-nose normal.    Physical Exam  Eyes:      Extraocular Movements: Extraocular movements intact.   Neurological:       "Motor: Motor strength is normal.  Psychiatric:         Speech: Speech normal.       Last Recorded Vitals  Blood pressure 142/80, pulse 74, temperature 36.9 °C (98.4 °F), temperature source Temporal, resp. rate 16, height 1.778 m (5' 10\"), weight 68.7 kg (151 lb 7.3 oz), SpO2 90 %.    Relevant Results        NIH Stroke Scale  1A. Level of Consciousness: Alert, Keenly Responsive  1B. Ask Month and Age: Both Questions Right  1C. Blink Eyes & Squeeze Hands: Performs Both Tasks  2. Best Gaze: Normal  3. Visual: No Visual Loss  4. Facial Palsy: Normal Symmetrical Movements  5A. Motor - Left Arm: No Drift  5B. Motor - Right Arm: No Drift  6A. Motor - Left Leg: No Drift  6B. Motor - Right Leg: No Drift  7. Limb Ataxia: Absent  8. Sensory Loss: Normal  9. Best Language: No Aphasia  10. Dysarthria: Normal  11. Extinction and Inattention: No Abnormality  NIH Stroke Scale: 0           [place] Coma Scale  Best Eye Response: Spontaneous  Best Verbal Response: Oriented  Best Motor Response: Follows commands  [place] Coma Scale Score: 15                 MRI brain personally reviewed, small scattered areas of encephalomalacia of the bilateral temporal lobes L>R consistent with his previous trauma      Assessment/Plan   Syncope - etiology more consistent for syncope though seizure remains on differential. He had no tongue trauma, and a minimal post ictal phase, and normal lactate on evaluation  History of TBI    Recommend outpatient cardiac monitor  Can consider outpatient EEG should there be further concern for seizure  Hold off on AEDs now as syncope more likely than seizure    Ok to drive but caution not to drive if he has symptoms of lightheadedness or dizziness   [facility]      Yuli Gray MD  "

## 2023-11-16 NOTE — TREATMENT PLAN
Pulse Oximetry on room air at rest: 92    2.   Pulse Oximetry on room air during ambulation: 87    3.   Pulse Oximetry on oxygen during ambulation: 90    4.   Amount of oxygen during ambulation: 2L    5.   Pulse oximetry during recovery: 91    6.   Amount of oxygen during recovery: N/A    Does this patient qualify for home O2? Yes    What is the patient's Pulmonary Diagnosis: COPD    What is the patient's DME company:    Comments:

## 2023-11-16 NOTE — PROGRESS NOTES
SW spoke to pt regarding ETOH. pt had reported he drinks daily but declines any treatment options going forward.

## 2023-12-26 ENCOUNTER — APPOINTMENT (OUTPATIENT)
Dept: CARDIOLOGY | Facility: HOSPITAL | Age: 73
End: 2023-12-26
Payer: MEDICARE

## 2024-01-04 ENCOUNTER — HOSPITAL ENCOUNTER (OUTPATIENT)
Dept: CARDIOLOGY | Facility: HOSPITAL | Age: 74
Discharge: HOME | End: 2024-01-04
Payer: MEDICARE

## 2024-01-04 DIAGNOSIS — R55 SYNCOPE, UNSPECIFIED SYNCOPE TYPE: ICD-10-CM

## 2024-01-04 PROCEDURE — 93246 EXT ECG>7D<15D RECORDING: CPT

## 2024-01-04 PROCEDURE — 93248 EXT ECG>7D<15D REV&INTERPJ: CPT | Performed by: INTERNAL MEDICINE

## 2024-03-12 ENCOUNTER — HOSPITAL ENCOUNTER (EMERGENCY)
Facility: HOSPITAL | Age: 74
Discharge: HOME | End: 2024-03-12
Attending: STUDENT IN AN ORGANIZED HEALTH CARE EDUCATION/TRAINING PROGRAM
Payer: MEDICARE

## 2024-03-12 ENCOUNTER — APPOINTMENT (OUTPATIENT)
Dept: RADIOLOGY | Facility: HOSPITAL | Age: 74
End: 2024-03-12
Payer: MEDICARE

## 2024-03-12 ENCOUNTER — APPOINTMENT (OUTPATIENT)
Dept: CARDIOLOGY | Facility: HOSPITAL | Age: 74
End: 2024-03-12
Payer: MEDICARE

## 2024-03-12 VITALS
WEIGHT: 165 LBS | TEMPERATURE: 98.6 F | SYSTOLIC BLOOD PRESSURE: 118 MMHG | HEIGHT: 65 IN | HEART RATE: 74 BPM | BODY MASS INDEX: 27.49 KG/M2 | RESPIRATION RATE: 15 BRPM | DIASTOLIC BLOOD PRESSURE: 87 MMHG | OXYGEN SATURATION: 94 %

## 2024-03-12 DIAGNOSIS — R51.9 ACUTE NONINTRACTABLE HEADACHE, UNSPECIFIED HEADACHE TYPE: ICD-10-CM

## 2024-03-12 DIAGNOSIS — J44.1 COPD EXACERBATION (MULTI): Primary | ICD-10-CM

## 2024-03-12 LAB
ANION GAP SERPL CALC-SCNC: 12 MMOL/L (ref 10–20)
ATRIAL RATE: 95 BPM
BASOPHILS # BLD AUTO: 0.06 X10*3/UL (ref 0–0.1)
BASOPHILS NFR BLD AUTO: 0.4 %
BUN SERPL-MCNC: 14 MG/DL (ref 6–23)
CALCIUM SERPL-MCNC: 9.6 MG/DL (ref 8.6–10.3)
CHLORIDE SERPL-SCNC: 92 MMOL/L (ref 98–107)
CO2 SERPL-SCNC: 28 MMOL/L (ref 21–32)
CREAT SERPL-MCNC: 1.01 MG/DL (ref 0.5–1.3)
EGFRCR SERPLBLD CKD-EPI 2021: 80 ML/MIN/1.73M*2
EOSINOPHIL # BLD AUTO: 0.04 X10*3/UL (ref 0–0.4)
EOSINOPHIL NFR BLD AUTO: 0.3 %
ERYTHROCYTE [DISTWIDTH] IN BLOOD BY AUTOMATED COUNT: 13.6 % (ref 11.5–14.5)
FLUAV RNA RESP QL NAA+PROBE: NOT DETECTED
FLUBV RNA RESP QL NAA+PROBE: NOT DETECTED
GLUCOSE SERPL-MCNC: 116 MG/DL (ref 74–99)
HCT VFR BLD AUTO: 41.3 % (ref 41–52)
HGB BLD-MCNC: 14.6 G/DL (ref 13.5–17.5)
IMM GRANULOCYTES # BLD AUTO: 0.11 X10*3/UL (ref 0–0.5)
IMM GRANULOCYTES NFR BLD AUTO: 0.7 % (ref 0–0.9)
LYMPHOCYTES # BLD AUTO: 0.57 X10*3/UL (ref 0.8–3)
LYMPHOCYTES NFR BLD AUTO: 3.6 %
MCH RBC QN AUTO: 29.6 PG (ref 26–34)
MCHC RBC AUTO-ENTMCNC: 35.4 G/DL (ref 32–36)
MCV RBC AUTO: 84 FL (ref 80–100)
MONOCYTES # BLD AUTO: 1.08 X10*3/UL (ref 0.05–0.8)
MONOCYTES NFR BLD AUTO: 6.8 %
NEUTROPHILS # BLD AUTO: 14.01 X10*3/UL (ref 1.6–5.5)
NEUTROPHILS NFR BLD AUTO: 88.2 %
NRBC BLD-RTO: 0 /100 WBCS (ref 0–0)
P AXIS: 74 DEGREES
P OFFSET: 206 MS
P ONSET: 143 MS
PLATELET # BLD AUTO: 373 X10*3/UL (ref 150–450)
POTASSIUM SERPL-SCNC: 3.5 MMOL/L (ref 3.5–5.3)
PR INTERVAL: 150 MS
Q ONSET: 218 MS
QRS COUNT: 15 BEATS
QRS DURATION: 94 MS
QT INTERVAL: 356 MS
QTC CALCULATION(BAZETT): 447 MS
QTC FREDERICIA: 414 MS
R AXIS: 83 DEGREES
RBC # BLD AUTO: 4.93 X10*6/UL (ref 4.5–5.9)
SARS-COV-2 RNA RESP QL NAA+PROBE: NOT DETECTED
SODIUM SERPL-SCNC: 128 MMOL/L (ref 136–145)
T AXIS: 74 DEGREES
T OFFSET: 396 MS
VENTRICULAR RATE: 95 BPM
WBC # BLD AUTO: 15.9 X10*3/UL (ref 4.4–11.3)

## 2024-03-12 PROCEDURE — 70450 CT HEAD/BRAIN W/O DYE: CPT

## 2024-03-12 PROCEDURE — 71046 X-RAY EXAM CHEST 2 VIEWS: CPT | Performed by: RADIOLOGY

## 2024-03-12 PROCEDURE — 71046 X-RAY EXAM CHEST 2 VIEWS: CPT

## 2024-03-12 PROCEDURE — 2500000004 HC RX 250 GENERAL PHARMACY W/ HCPCS (ALT 636 FOR OP/ED): Performed by: STUDENT IN AN ORGANIZED HEALTH CARE EDUCATION/TRAINING PROGRAM

## 2024-03-12 PROCEDURE — 99285 EMERGENCY DEPT VISIT HI MDM: CPT | Mod: 25

## 2024-03-12 PROCEDURE — 36415 COLL VENOUS BLD VENIPUNCTURE: CPT | Performed by: STUDENT IN AN ORGANIZED HEALTH CARE EDUCATION/TRAINING PROGRAM

## 2024-03-12 PROCEDURE — 70450 CT HEAD/BRAIN W/O DYE: CPT | Performed by: STUDENT IN AN ORGANIZED HEALTH CARE EDUCATION/TRAINING PROGRAM

## 2024-03-12 PROCEDURE — 96361 HYDRATE IV INFUSION ADD-ON: CPT

## 2024-03-12 PROCEDURE — 80048 BASIC METABOLIC PNL TOTAL CA: CPT | Performed by: STUDENT IN AN ORGANIZED HEALTH CARE EDUCATION/TRAINING PROGRAM

## 2024-03-12 PROCEDURE — 96374 THER/PROPH/DIAG INJ IV PUSH: CPT

## 2024-03-12 PROCEDURE — 85025 COMPLETE CBC W/AUTO DIFF WBC: CPT | Performed by: STUDENT IN AN ORGANIZED HEALTH CARE EDUCATION/TRAINING PROGRAM

## 2024-03-12 PROCEDURE — 87636 SARSCOV2 & INF A&B AMP PRB: CPT | Performed by: STUDENT IN AN ORGANIZED HEALTH CARE EDUCATION/TRAINING PROGRAM

## 2024-03-12 PROCEDURE — 96375 TX/PRO/DX INJ NEW DRUG ADDON: CPT

## 2024-03-12 PROCEDURE — 93005 ELECTROCARDIOGRAM TRACING: CPT

## 2024-03-12 RX ORDER — MORPHINE SULFATE 4 MG/ML
4 INJECTION, SOLUTION INTRAMUSCULAR; INTRAVENOUS ONCE
Status: COMPLETED | OUTPATIENT
Start: 2024-03-12 | End: 2024-03-12

## 2024-03-12 RX ORDER — ACETAMINOPHEN 325 MG/1
650 TABLET ORAL ONCE
Status: COMPLETED | OUTPATIENT
Start: 2024-03-12 | End: 2024-03-12

## 2024-03-12 RX ORDER — PREDNISONE 20 MG/1
40 TABLET ORAL DAILY
Qty: 10 TABLET | Refills: 0 | Status: SHIPPED | OUTPATIENT
Start: 2024-03-12 | End: 2024-03-17

## 2024-03-12 RX ADMIN — MORPHINE SULFATE 4 MG: 4 INJECTION, SOLUTION INTRAMUSCULAR; INTRAVENOUS at 14:03

## 2024-03-12 RX ADMIN — ACETAMINOPHEN 650 MG: 325 TABLET ORAL at 14:03

## 2024-03-12 RX ADMIN — METHYLPREDNISOLONE SODIUM SUCCINATE 125 MG: 125 INJECTION, POWDER, FOR SOLUTION INTRAMUSCULAR; INTRAVENOUS at 12:52

## 2024-03-12 RX ADMIN — SODIUM CHLORIDE 500 ML: 9 INJECTION, SOLUTION INTRAVENOUS at 14:03

## 2024-03-12 ASSESSMENT — PAIN DESCRIPTION - ONSET: ONSET: SUDDEN

## 2024-03-12 ASSESSMENT — PAIN DESCRIPTION - LOCATION: LOCATION: BACK

## 2024-03-12 ASSESSMENT — PAIN DESCRIPTION - PAIN TYPE: TYPE: ACUTE PAIN

## 2024-03-12 ASSESSMENT — PAIN DESCRIPTION - PROGRESSION: CLINICAL_PROGRESSION: NOT CHANGED

## 2024-03-12 ASSESSMENT — COLUMBIA-SUICIDE SEVERITY RATING SCALE - C-SSRS
2. HAVE YOU ACTUALLY HAD ANY THOUGHTS OF KILLING YOURSELF?: NO
1. IN THE PAST MONTH, HAVE YOU WISHED YOU WERE DEAD OR WISHED YOU COULD GO TO SLEEP AND NOT WAKE UP?: NO
6. HAVE YOU EVER DONE ANYTHING, STARTED TO DO ANYTHING, OR PREPARED TO DO ANYTHING TO END YOUR LIFE?: NO

## 2024-03-12 ASSESSMENT — PAIN DESCRIPTION - ORIENTATION: ORIENTATION: MID

## 2024-03-12 ASSESSMENT — LIFESTYLE VARIABLES
HAVE YOU EVER FELT YOU SHOULD CUT DOWN ON YOUR DRINKING: NO
HAVE PEOPLE ANNOYED YOU BY CRITICIZING YOUR DRINKING: NO
EVER FELT BAD OR GUILTY ABOUT YOUR DRINKING: NO
EVER HAD A DRINK FIRST THING IN THE MORNING TO STEADY YOUR NERVES TO GET RID OF A HANGOVER: NO

## 2024-03-12 ASSESSMENT — PAIN - FUNCTIONAL ASSESSMENT: PAIN_FUNCTIONAL_ASSESSMENT: 0-10

## 2024-03-12 ASSESSMENT — PAIN DESCRIPTION - FREQUENCY: FREQUENCY: CONSTANT/CONTINUOUS

## 2024-03-12 ASSESSMENT — PAIN SCALES - GENERAL
PAINLEVEL_OUTOF10: 3
PAINLEVEL_OUTOF10: 6
PAINLEVEL_OUTOF10: 5 - MODERATE PAIN

## 2024-03-12 ASSESSMENT — PAIN DESCRIPTION - DESCRIPTORS: DESCRIPTORS: ACHING

## 2024-03-12 NOTE — ED PROVIDER NOTES
HPI   Chief Complaint   Patient presents with    Shortness of Breath       73-year-old male brought in by EMS from work for evaluation of shortness of breath.  History of COPD and hypertension.  Patient was given single DuoNeb treatment by EMS prior to arrival with significant improvement in symptoms.  Patient wears oxygen at night or as needed.  States that he felt fine prior to work, denies any recent flulike symptoms.  No chest pain.      History provided by:  Patient and EMS personnel                      No data recorded                   Patient History   Past Medical History:   Diagnosis Date    COPD (chronic obstructive pulmonary disease) (CMS/[facility])     Hypertension      No past surgical history on file.  No family history on file.  Social History     Tobacco Use    Smoking status: Every Day     Packs/day: 1.00     Years: 55.00     Additional pack years: 0.00     Total pack years: 55.00     Types: Cigarettes    Smokeless tobacco: Never   Vaping Use    Vaping Use: Never used   Substance Use Topics    Alcohol use: Defer     Alcohol/week: 5.0 standard drinks of alcohol     Types: 5 Cans of beer per week    Drug use: Yes     Types: Marijuana       Physical Exam   ED Triage Vitals [03/12/24 1132]   Temperature Heart Rate Respirations BP   37 °C (98.6 °F) 92 20 132/76      Pulse Ox Temp Source Heart Rate Source Patient Position   (!) 92 % Temporal Monitor Sitting      BP Location FiO2 (%)     Right arm --       Physical Exam  Vitals and nursing note reviewed.   Constitutional:       General: He is not in acute distress.  HENT:      Head: Atraumatic.      Mouth/Throat:      Mouth: Mucous membranes are moist.      Pharynx: Oropharynx is clear.   Eyes:      Conjunctiva/sclera: Conjunctivae normal.   Cardiovascular:      Rate and Rhythm: Normal rate and regular rhythm.      Pulses: Normal pulses.   Pulmonary:      Effort: Pulmonary effort is normal. No respiratory distress.      Breath sounds: Normal breath sounds.    Abdominal:      General: There is no distension.      Palpations: Abdomen is soft.      Tenderness: There is no abdominal tenderness. There is no guarding or rebound.   Musculoskeletal:         General: No deformity.      Cervical back: Neck supple.   Skin:     General: Skin is warm and dry.   Neurological:      Mental Status: He is alert and oriented to person, place, and time. Mental status is at baseline.      Cranial Nerves: No cranial nerve deficit.      Sensory: No sensory deficit.      Motor: No weakness.   Psychiatric:         Mood and Affect: Mood normal.         Behavior: Behavior normal.         ED Course & MDM   Diagnoses as of 03/12/24 1459   COPD exacerbation (CMS/[facility])   Acute nonintractable headache, unspecified headache type     Labs Reviewed   CBC WITH AUTO DIFFERENTIAL - Abnormal       Result Value    WBC 15.9 (*)     nRBC 0.0      RBC 4.93      Hemoglobin 14.6      Hematocrit 41.3      MCV 84      MCH 29.6      MCHC 35.4      RDW 13.6      Platelets 373      Neutrophils % 88.2      Immature Granulocytes %, Automated 0.7      Lymphocytes % 3.6      Monocytes % 6.8      Eosinophils % 0.3      Basophils % 0.4      Neutrophils Absolute 14.01 (*)     Immature Granulocytes Absolute, Automated 0.11      Lymphocytes Absolute 0.57 (*)     Monocytes Absolute 1.08 (*)     Eosinophils Absolute 0.04      Basophils Absolute 0.06     BASIC METABOLIC PANEL - Abnormal    Glucose 116 (*)     Sodium 128 (*)     Potassium 3.5      Chloride 92 (*)     Bicarbonate 28      Anion Gap 12      Urea Nitrogen 14      Creatinine 1.01      eGFR 80      Calcium 9.6     SARS-COV-2 AND INFLUENZA A/B PCR - Normal    Flu A Result Not Detected      Flu B Result Not Detected      Coronavirus 2019, PCR Not Detected      Narrative:     This assay has received FDA Emergency Use Authorization (EUA) and  is only authorized for the duration of time that circumstances exist to justify the authorization of the emergency use of in vitro  diagnostic tests for the detection of SARS-CoV-2 virus and/or diagnosis of COVID-19 infection under section 564(b)(1) of the Act, 21 U.S.C. 360bbb-3(b)(1). Testing for SARS-CoV-2 is only recommended for patients who meet current clinical and/or epidemiological criteria as defined by federal, state, or local public health directives. This assay is an in vitro diagnostic nucleic acid amplification test for the qualitative detection of SARS-CoV-2, Influenza A, and Influenza B from nasopharyngeal specimens and has been validated for use at [facility]. Negative results do not preclude COVID-19 infections or Influenza A/B infections, and should not be used as the sole basis for diagnosis, treatment, or other management decisions. If Influenza A/B and RSV PCR results are negative, testing for Parainfluenza virus, Adenovirus and Metapneumovirus is routinely performed for [facility] pediatric oncology and intensive care inpatients, and is available on other patients by placing an add-on request.      CT head wo IV contrast   Final Result   No acute intracranial abnormality.        MACRO   None        Signed by: Lucio Mayorga 3/12/2024 2:41 PM   Dictation workstation:   [id number]      XR chest 2 views   Final Result   Slight midthoracic spine dextroscoliosis.        Very mild stable scarring at the right lung base.        No significant or acute interval change.        MACRO:   None        Signed by: Des Gaxiola 3/12/2024 12:38 PM   Dictation workstation:   [id number]            Medical Decision Making  73-year-old male with a history of COPD presenting with worsening shortness of breath.  Given DuoNeb treatment prior to arrival by EMS with improvement in symptoms.  On exam patient in no acute respiratory distress, breathing comfortably on room air.  SpO2 92%.  No significant tachycardia.  Lungs without significant wheezing on exam.  No significant peripheral edema.  Diagnostic evaluation performed  including chest x-ray and lab work.  Viral swabs obtained.  Patient was given Solu-Medrol 125 mg IV for COPD exacerbation.    Labwork reviewed.  CBC with nonspecific leukocytosis, normal H&H.  Metabolic panel with hyponatremia with sodium of 128 which appears to be at patient's baseline compared to prior lab work.  Normal renal function.  Influenza and COVID swabs negative.  Chest x-ray without acute findings, no infiltrate or pneumothorax.  On reevaluation patient reports his breathing has improved, maintaining normal SpO2 on room air.  Patient complains of headache on reevaluation.  States he has been having a headache most of the day, diffuse, gradual in onset.  States he does not normally get headaches.  CT imaging of the head was obtained and reviewed and is negative for acute findings.  No intracranial hemorrhage or large masses.  Patient was treated with IV fluids and morphine IV.  On reevaluation patient reports improvement in symptoms.  Patient stable for discharge home.  Plan to discharge patient home on prednisone for COPD exacerbation.    Amount and/or Complexity of Data Reviewed  ECG/medicine tests: ordered and independent interpretation performed. Decision-making details documented in ED Course.     Details: Twelve-lead ECG was obtained at 1144 by my interpretation demonstrates normal sinus rhythm with a rate of 95, no acute ST elevation or depression.  No other acute ischemic changes.        Procedure  Procedures     Basilio Sanchez MD  03/12/24 6543

## 2024-05-04 ENCOUNTER — APPOINTMENT (OUTPATIENT)
Dept: CARDIOLOGY | Facility: HOSPITAL | Age: 74
DRG: 190 | End: 2024-05-04
Payer: MEDICARE

## 2024-05-04 ENCOUNTER — APPOINTMENT (OUTPATIENT)
Dept: RADIOLOGY | Facility: HOSPITAL | Age: 74
DRG: 190 | End: 2024-05-04
Payer: MEDICARE

## 2024-05-04 ENCOUNTER — HOSPITAL ENCOUNTER (INPATIENT)
Facility: HOSPITAL | Age: 74
LOS: 3 days | Discharge: HOME | DRG: 190 | End: 2024-05-07
Attending: EMERGENCY MEDICINE | Admitting: STUDENT IN AN ORGANIZED HEALTH CARE EDUCATION/TRAINING PROGRAM
Payer: MEDICARE

## 2024-05-04 DIAGNOSIS — R09.02 HYPOXEMIA: ICD-10-CM

## 2024-05-04 DIAGNOSIS — S22.42XA CLOSED FRACTURE OF MULTIPLE RIBS OF LEFT SIDE, INITIAL ENCOUNTER: ICD-10-CM
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
## 2024-05-04 LAB
ALBUMIN SERPL BCP-MCNC: 4.5 G/DL (ref 3.4–5)
ALP SERPL-CCNC: 152 U/L (ref 33–136)
ALT SERPL W P-5'-P-CCNC: 16 U/L (ref 10–52)
ANION GAP SERPL CALC-SCNC: 12 MMOL/L (ref 10–20)
AST SERPL W P-5'-P-CCNC: 16 U/L (ref 9–39)
ATRIAL RATE: 91 BPM
BASOPHILS # BLD AUTO: 0.06 X10*3/UL (ref 0–0.1)
BASOPHILS NFR BLD AUTO: 0.3 %
BILIRUB SERPL-MCNC: 0.8 MG/DL (ref 0–1.2)
BNP SERPL-MCNC: 119 PG/ML (ref 0–99)
BUN SERPL-MCNC: 13 MG/DL (ref 6–23)
CALCIUM SERPL-MCNC: 10.7 MG/DL (ref 8.6–10.3)
CARDIAC TROPONIN I PNL SERPL HS: 11 NG/L (ref 0–20)
CARDIAC TROPONIN I PNL SERPL HS: 12 NG/L (ref 0–20)
CHLORIDE SERPL-SCNC: 96 MMOL/L (ref 98–107)
CO2 SERPL-SCNC: 31 MMOL/L (ref 21–32)
CREAT SERPL-MCNC: 0.99 MG/DL (ref 0.5–1.3)
EGFRCR SERPLBLD CKD-EPI 2021: 81 ML/MIN/1.73M*2
EOSINOPHIL # BLD AUTO: 0.03 X10*3/UL (ref 0–0.4)
EOSINOPHIL NFR BLD AUTO: 0.2 %
ERYTHROCYTE [DISTWIDTH] IN BLOOD BY AUTOMATED COUNT: 14 % (ref 11.5–14.5)
FLUAV RNA RESP QL NAA+PROBE: NOT DETECTED
FLUBV RNA RESP QL NAA+PROBE: NOT DETECTED
GLUCOSE SERPL-MCNC: 116 MG/DL (ref 74–99)
HCT VFR BLD AUTO: 46.9 % (ref 41–52)
HGB BLD-MCNC: 16 G/DL (ref 13.5–17.5)
HOLD SPECIMEN: NORMAL
IMM GRANULOCYTES # BLD AUTO: 0.1 X10*3/UL (ref 0–0.5)
IMM GRANULOCYTES NFR BLD AUTO: 0.6 % (ref 0–0.9)
LACTATE SERPL-SCNC: 1.2 MMOL/L (ref 0.4–2)
LYMPHOCYTES # BLD AUTO: 0.53 X10*3/UL (ref 0.8–3)
LYMPHOCYTES NFR BLD AUTO: 3 %
MAGNESIUM SERPL-MCNC: 1.8 MG/DL (ref 1.6–2.4)
MCH RBC QN AUTO: 29.6 PG (ref 26–34)
MCHC RBC AUTO-ENTMCNC: 34.1 G/DL (ref 32–36)
MCV RBC AUTO: 87 FL (ref 80–100)
MONOCYTES # BLD AUTO: 1.39 X10*3/UL (ref 0.05–0.8)
MONOCYTES NFR BLD AUTO: 7.9 %
NEUTROPHILS # BLD AUTO: 15.38 X10*3/UL (ref 1.6–5.5)
NEUTROPHILS NFR BLD AUTO: 88 %
NRBC BLD-RTO: 0 /100 WBCS (ref 0–0)
P AXIS: 66 DEGREES
P OFFSET: 210 MS
P ONSET: 142 MS
PLATELET # BLD AUTO: 431 X10*3/UL (ref 150–450)
POTASSIUM SERPL-SCNC: 3.9 MMOL/L (ref 3.5–5.3)
PR INTERVAL: 160 MS
PROT SERPL-MCNC: 7.7 G/DL (ref 6.4–8.2)
Q ONSET: 222 MS
QRS COUNT: 15 BEATS
QRS DURATION: 84 MS
QT INTERVAL: 326 MS
QTC CALCULATION(BAZETT): 400 MS
QTC FREDERICIA: 374 MS
R AXIS: 61 DEGREES
RBC # BLD AUTO: 5.4 X10*6/UL (ref 4.5–5.9)
RSV RNA RESP QL NAA+PROBE: NOT DETECTED
SARS-COV-2 RNA RESP QL NAA+PROBE: NOT DETECTED
SODIUM SERPL-SCNC: 135 MMOL/L (ref 136–145)
T AXIS: 78 DEGREES
T OFFSET: 385 MS
VENTRICULAR RATE: 91 BPM
WBC # BLD AUTO: 17.5 X10*3/UL (ref 4.4–11.3)

## 2024-05-04 PROCEDURE — 85025 COMPLETE CBC W/AUTO DIFF WBC: CPT | Performed by: PHYSICIAN ASSISTANT

## 2024-05-04 PROCEDURE — 2500000005 HC RX 250 GENERAL PHARMACY W/O HCPCS: Performed by: PHYSICIAN ASSISTANT

## 2024-05-04 PROCEDURE — 83735 ASSAY OF MAGNESIUM: CPT | Performed by: PHYSICIAN ASSISTANT

## 2024-05-04 PROCEDURE — 93005 ELECTROCARDIOGRAM TRACING: CPT

## 2024-05-04 PROCEDURE — 72128 CT CHEST SPINE W/O DYE: CPT | Mod: RCN

## 2024-05-04 PROCEDURE — 36415 COLL VENOUS BLD VENIPUNCTURE: CPT | Performed by: PHYSICIAN ASSISTANT

## 2024-05-04 PROCEDURE — 71260 CT THORAX DX C+: CPT | Performed by: RADIOLOGY

## 2024-05-04 PROCEDURE — 70450 CT HEAD/BRAIN W/O DYE: CPT | Performed by: RADIOLOGY

## 2024-05-04 PROCEDURE — 87637 SARSCOV2&INF A&B&RSV AMP PRB: CPT | Performed by: PHYSICIAN ASSISTANT

## 2024-05-04 PROCEDURE — 71260 CT THORAX DX C+: CPT

## 2024-05-04 PROCEDURE — 94640 AIRWAY INHALATION TREATMENT: CPT

## 2024-05-04 PROCEDURE — 72125 CT NECK SPINE W/O DYE: CPT | Performed by: RADIOLOGY

## 2024-05-04 PROCEDURE — 2500000004 HC RX 250 GENERAL PHARMACY W/ HCPCS (ALT 636 FOR OP/ED): Performed by: PHYSICIAN ASSISTANT

## 2024-05-04 PROCEDURE — 84484 ASSAY OF TROPONIN QUANT: CPT | Performed by: PHYSICIAN ASSISTANT

## 2024-05-04 PROCEDURE — 70450 CT HEAD/BRAIN W/O DYE: CPT

## 2024-05-04 PROCEDURE — 99222 1ST HOSP IP/OBS MODERATE 55: CPT | Performed by: STUDENT IN AN ORGANIZED HEALTH CARE EDUCATION/TRAINING PROGRAM

## 2024-05-04 PROCEDURE — 72125 CT NECK SPINE W/O DYE: CPT

## 2024-05-04 PROCEDURE — 2500000002 HC RX 250 W HCPCS SELF ADMINISTERED DRUGS (ALT 637 FOR MEDICARE OP, ALT 636 FOR OP/ED): Performed by: PHYSICIAN ASSISTANT

## 2024-05-04 PROCEDURE — 83880 ASSAY OF NATRIURETIC PEPTIDE: CPT | Performed by: PHYSICIAN ASSISTANT

## 2024-05-04 PROCEDURE — 2500000005 HC RX 250 GENERAL PHARMACY W/O HCPCS: Performed by: STUDENT IN AN ORGANIZED HEALTH CARE EDUCATION/TRAINING PROGRAM

## 2024-05-04 PROCEDURE — 71045 X-RAY EXAM CHEST 1 VIEW: CPT | Performed by: RADIOLOGY

## 2024-05-04 PROCEDURE — 72128 CT CHEST SPINE W/O DYE: CPT | Mod: RCN | Performed by: RADIOLOGY

## 2024-05-04 PROCEDURE — 1200000002 HC GENERAL ROOM WITH TELEMETRY DAILY

## 2024-05-04 PROCEDURE — 96375 TX/PRO/DX INJ NEW DRUG ADDON: CPT

## 2024-05-04 PROCEDURE — 72131 CT LUMBAR SPINE W/O DYE: CPT | Mod: RCN | Performed by: RADIOLOGY

## 2024-05-04 PROCEDURE — 96365 THER/PROPH/DIAG IV INF INIT: CPT

## 2024-05-04 PROCEDURE — 99285 EMERGENCY DEPT VISIT HI MDM: CPT | Mod: 25,CS

## 2024-05-04 PROCEDURE — 83605 ASSAY OF LACTIC ACID: CPT | Performed by: PHYSICIAN ASSISTANT

## 2024-05-04 PROCEDURE — 72131 CT LUMBAR SPINE W/O DYE: CPT | Mod: RCN

## 2024-05-04 PROCEDURE — 2550000001 HC RX 255 CONTRASTS: Performed by: EMERGENCY MEDICINE

## 2024-05-04 PROCEDURE — 2500000001 HC RX 250 WO HCPCS SELF ADMINISTERED DRUGS (ALT 637 FOR MEDICARE OP): Performed by: STUDENT IN AN ORGANIZED HEALTH CARE EDUCATION/TRAINING PROGRAM

## 2024-05-04 PROCEDURE — 94667 MNPJ CHEST WALL 1ST: CPT

## 2024-05-04 PROCEDURE — 80053 COMPREHEN METABOLIC PANEL: CPT | Performed by: PHYSICIAN ASSISTANT

## 2024-05-04 PROCEDURE — 2500000004 HC RX 250 GENERAL PHARMACY W/ HCPCS (ALT 636 FOR OP/ED): Performed by: STUDENT IN AN ORGANIZED HEALTH CARE EDUCATION/TRAINING PROGRAM

## 2024-05-04 PROCEDURE — 2500000002 HC RX 250 W HCPCS SELF ADMINISTERED DRUGS (ALT 637 FOR MEDICARE OP, ALT 636 FOR OP/ED): Mod: MUE | Performed by: STUDENT IN AN ORGANIZED HEALTH CARE EDUCATION/TRAINING PROGRAM

## 2024-05-04 PROCEDURE — 74177 CT ABD & PELVIS W/CONTRAST: CPT | Performed by: RADIOLOGY

## 2024-05-04 PROCEDURE — 74177 CT ABD & PELVIS W/CONTRAST: CPT

## 2024-05-04 PROCEDURE — 71045 X-RAY EXAM CHEST 1 VIEW: CPT

## 2024-05-04 RX ORDER — LORAZEPAM 1 MG/1
1 TABLET ORAL EVERY 2 HOUR PRN
Status: DISCONTINUED | OUTPATIENT
Start: 2024-05-04 | End: 2024-05-07 | Stop reason: HOSPADM

## 2024-05-04 RX ORDER — TALC
3 POWDER (GRAM) TOPICAL NIGHTLY PRN
Status: DISCONTINUED | OUTPATIENT
Start: 2024-05-04 | End: 2024-05-07 | Stop reason: HOSPADM

## 2024-05-04 RX ORDER — ACETYLCYSTEINE 200 MG/ML
3 SOLUTION ORAL; RESPIRATORY (INHALATION)
Status: DISCONTINUED | OUTPATIENT
Start: 2024-05-04 | End: 2024-05-07 | Stop reason: HOSPADM

## 2024-05-04 RX ORDER — ACETAMINOPHEN 650 MG/1
650 SUPPOSITORY RECTAL EVERY 4 HOURS PRN
Status: DISCONTINUED | OUTPATIENT
Start: 2024-05-04 | End: 2024-05-07 | Stop reason: HOSPADM

## 2024-05-04 RX ORDER — LANOLIN ALCOHOL/MO/W.PET/CERES
420 CREAM (GRAM) TOPICAL EVERY 24 HOURS
Status: DISCONTINUED | OUTPATIENT
Start: 2024-05-04 | End: 2024-05-07 | Stop reason: HOSPADM

## 2024-05-04 RX ORDER — LIDOCAINE 560 MG/1
1 PATCH PERCUTANEOUS; TOPICAL; TRANSDERMAL DAILY
Status: DISCONTINUED | OUTPATIENT
Start: 2024-05-04 | End: 2024-05-07 | Stop reason: HOSPADM

## 2024-05-04 RX ORDER — ONDANSETRON 4 MG/1
4 TABLET, FILM COATED ORAL EVERY 8 HOURS PRN
Status: DISCONTINUED | OUTPATIENT
Start: 2024-05-04 | End: 2024-05-07 | Stop reason: HOSPADM

## 2024-05-04 RX ORDER — ACETAMINOPHEN 160 MG/5ML
650 SOLUTION ORAL EVERY 4 HOURS PRN
Status: DISCONTINUED | OUTPATIENT
Start: 2024-05-04 | End: 2024-05-07 | Stop reason: HOSPADM

## 2024-05-04 RX ORDER — FAMOTIDINE 20 MG/1
20 TABLET, FILM COATED ORAL 2 TIMES DAILY
Status: DISCONTINUED | OUTPATIENT
Start: 2024-05-04 | End: 2024-05-07 | Stop reason: HOSPADM

## 2024-05-04 RX ORDER — ATORVASTATIN CALCIUM 20 MG/1
40 TABLET, FILM COATED ORAL NIGHTLY
Status: DISCONTINUED | OUTPATIENT
Start: 2024-05-04 | End: 2024-05-07 | Stop reason: HOSPADM

## 2024-05-04 RX ORDER — ONDANSETRON HYDROCHLORIDE 2 MG/ML
4 INJECTION, SOLUTION INTRAVENOUS ONCE
Status: COMPLETED | OUTPATIENT
Start: 2024-05-04 | End: 2024-05-04

## 2024-05-04 RX ORDER — MAGNESIUM SULFATE HEPTAHYDRATE 40 MG/ML
2 INJECTION, SOLUTION INTRAVENOUS ONCE
Status: COMPLETED | OUTPATIENT
Start: 2024-05-04 | End: 2024-05-04

## 2024-05-04 RX ORDER — IPRATROPIUM BROMIDE AND ALBUTEROL SULFATE 2.5; .5 MG/3ML; MG/3ML
3 SOLUTION RESPIRATORY (INHALATION)
Status: DISCONTINUED | OUTPATIENT
Start: 2024-05-04 | End: 2024-05-06

## 2024-05-04 RX ORDER — POLYETHYLENE GLYCOL 3350 17 G/17G
17 POWDER, FOR SOLUTION ORAL DAILY PRN
Status: DISCONTINUED | OUTPATIENT
Start: 2024-05-04 | End: 2024-05-07 | Stop reason: HOSPADM

## 2024-05-04 RX ORDER — LORAZEPAM 1 MG/1
2 TABLET ORAL EVERY 2 HOUR PRN
Status: DISCONTINUED | OUTPATIENT
Start: 2024-05-04 | End: 2024-05-07 | Stop reason: HOSPADM

## 2024-05-04 RX ORDER — AMLODIPINE BESYLATE 5 MG/1
10 TABLET ORAL DAILY
Status: DISCONTINUED | OUTPATIENT
Start: 2024-05-04 | End: 2024-05-07 | Stop reason: HOSPADM

## 2024-05-04 RX ORDER — ACETAMINOPHEN 325 MG/1
650 TABLET ORAL EVERY 4 HOURS PRN
Status: DISCONTINUED | OUTPATIENT
Start: 2024-05-04 | End: 2024-05-07 | Stop reason: HOSPADM

## 2024-05-04 RX ORDER — ALBUTEROL SULFATE 0.83 MG/ML
2.5 SOLUTION RESPIRATORY (INHALATION) EVERY 20 MIN
Status: COMPLETED | OUTPATIENT
Start: 2024-05-04 | End: 2024-05-04

## 2024-05-04 RX ORDER — FAMOTIDINE 10 MG/ML
20 INJECTION INTRAVENOUS 2 TIMES DAILY
Status: DISCONTINUED | OUTPATIENT
Start: 2024-05-04 | End: 2024-05-07 | Stop reason: HOSPADM

## 2024-05-04 RX ORDER — ONDANSETRON HYDROCHLORIDE 2 MG/ML
4 INJECTION, SOLUTION INTRAVENOUS EVERY 8 HOURS PRN
Status: DISCONTINUED | OUTPATIENT
Start: 2024-05-04 | End: 2024-05-07 | Stop reason: HOSPADM

## 2024-05-04 RX ORDER — LANOLIN ALCOHOL/MO/W.PET/CERES
100 CREAM (GRAM) TOPICAL DAILY
Status: DISCONTINUED | OUTPATIENT
Start: 2024-05-04 | End: 2024-05-07 | Stop reason: HOSPADM

## 2024-05-04 RX ORDER — MULTIVIT-MIN/IRON FUM/FOLIC AC 7.5 MG-4
1 TABLET ORAL DAILY
Status: DISCONTINUED | OUTPATIENT
Start: 2024-05-04 | End: 2024-05-07 | Stop reason: HOSPADM

## 2024-05-04 RX ORDER — VALSARTAN 80 MG/1
80 TABLET ORAL DAILY
Status: DISCONTINUED | OUTPATIENT
Start: 2024-05-04 | End: 2024-05-07 | Stop reason: HOSPADM

## 2024-05-04 RX ORDER — IPRATROPIUM BROMIDE AND ALBUTEROL SULFATE 2.5; .5 MG/3ML; MG/3ML
3 SOLUTION RESPIRATORY (INHALATION) ONCE
Status: COMPLETED | OUTPATIENT
Start: 2024-05-04 | End: 2024-05-04

## 2024-05-04 RX ORDER — MORPHINE SULFATE 4 MG/ML
4 INJECTION, SOLUTION INTRAMUSCULAR; INTRAVENOUS ONCE
Status: COMPLETED | OUTPATIENT
Start: 2024-05-04 | End: 2024-05-04

## 2024-05-04 RX ORDER — IBUPROFEN 200 MG
1 TABLET ORAL DAILY
Status: DISCONTINUED | OUTPATIENT
Start: 2024-05-04 | End: 2024-05-07 | Stop reason: HOSPADM

## 2024-05-04 RX ORDER — LEVOFLOXACIN 5 MG/ML
750 INJECTION, SOLUTION INTRAVENOUS EVERY 24 HOURS
Status: DISCONTINUED | OUTPATIENT
Start: 2024-05-04 | End: 2024-05-07 | Stop reason: HOSPADM

## 2024-05-04 RX ORDER — ENOXAPARIN SODIUM 100 MG/ML
40 INJECTION SUBCUTANEOUS EVERY 24 HOURS
Status: DISCONTINUED | OUTPATIENT
Start: 2024-05-04 | End: 2024-05-07 | Stop reason: HOSPADM

## 2024-05-04 RX ORDER — LORAZEPAM 0.5 MG/1
0.5 TABLET ORAL EVERY 2 HOUR PRN
Status: DISCONTINUED | OUTPATIENT
Start: 2024-05-04 | End: 2024-05-07 | Stop reason: HOSPADM

## 2024-05-04 RX ORDER — FOLIC ACID 1 MG/1
1 TABLET ORAL DAILY
Status: DISCONTINUED | OUTPATIENT
Start: 2024-05-04 | End: 2024-05-07 | Stop reason: HOSPADM

## 2024-05-04 RX ORDER — OXYCODONE HYDROCHLORIDE 5 MG/1
5 TABLET ORAL EVERY 6 HOURS PRN
Status: DISCONTINUED | OUTPATIENT
Start: 2024-05-04 | End: 2024-05-07 | Stop reason: HOSPADM

## 2024-05-04 RX ADMIN — FAMOTIDINE 20 MG: 20 TABLET, FILM COATED ORAL at 20:46

## 2024-05-04 RX ADMIN — Medication 5 L/MIN: at 09:00

## 2024-05-04 RX ADMIN — ATORVASTATIN CALCIUM 40 MG: 20 TABLET, FILM COATED ORAL at 20:46

## 2024-05-04 RX ADMIN — FOLIC ACID 1 MG: 1 TABLET ORAL at 18:44

## 2024-05-04 RX ADMIN — Medication 5 L/MIN: at 20:00

## 2024-05-04 RX ADMIN — ACETYLCYSTEINE 600 MG: 200 SOLUTION ORAL; RESPIRATORY (INHALATION) at 20:56

## 2024-05-04 RX ADMIN — Medication 4 L/MIN: at 08:20

## 2024-05-04 RX ADMIN — Medication 100 MG: at 18:44

## 2024-05-04 RX ADMIN — Medication 1 TABLET: at 18:44

## 2024-05-04 RX ADMIN — IOHEXOL 75 ML: 350 INJECTION, SOLUTION INTRAVENOUS at 09:37

## 2024-05-04 RX ADMIN — IPRATROPIUM BROMIDE AND ALBUTEROL SULFATE 3 ML: 2.5; .5 SOLUTION RESPIRATORY (INHALATION) at 23:51

## 2024-05-04 RX ADMIN — Medication 5 L/MIN: at 09:02

## 2024-05-04 RX ADMIN — METHYLPREDNISOLONE SODIUM SUCCINATE 125 MG: 125 INJECTION, POWDER, FOR SOLUTION INTRAMUSCULAR; INTRAVENOUS at 08:36

## 2024-05-04 RX ADMIN — MAGNESIUM OXIDE 400 MG (241.3 MG MAGNESIUM) TABLET 400 MG: TABLET at 18:43

## 2024-05-04 RX ADMIN — Medication 5 L/MIN: at 15:45

## 2024-05-04 RX ADMIN — MAGNESIUM SULFATE HEPTAHYDRATE 2 G: 40 INJECTION, SOLUTION INTRAVENOUS at 08:37

## 2024-05-04 RX ADMIN — IPRATROPIUM BROMIDE AND ALBUTEROL SULFATE 3 ML: 2.5; .5 SOLUTION RESPIRATORY (INHALATION) at 16:20

## 2024-05-04 RX ADMIN — AMLODIPINE BESYLATE 10 MG: 5 TABLET ORAL at 18:44

## 2024-05-04 RX ADMIN — ONDANSETRON 4 MG: 2 INJECTION INTRAMUSCULAR; INTRAVENOUS at 08:36

## 2024-05-04 RX ADMIN — ALBUTEROL SULFATE 2.5 MG: 2.5 SOLUTION RESPIRATORY (INHALATION) at 08:56

## 2024-05-04 RX ADMIN — ALBUTEROL SULFATE 2.5 MG: 2.5 SOLUTION RESPIRATORY (INHALATION) at 09:00

## 2024-05-04 RX ADMIN — IPRATROPIUM BROMIDE AND ALBUTEROL SULFATE 3 ML: 2.5; .5 SOLUTION RESPIRATORY (INHALATION) at 20:56

## 2024-05-04 RX ADMIN — METHYLPREDNISOLONE SODIUM SUCCINATE 40 MG: 40 INJECTION, POWDER, FOR SOLUTION INTRAMUSCULAR; INTRAVENOUS at 16:11

## 2024-05-04 RX ADMIN — LEVOFLOXACIN 750 MG: 5 INJECTION, SOLUTION INTRAVENOUS at 16:13

## 2024-05-04 RX ADMIN — OXYCODONE HYDROCHLORIDE 5 MG: 5 TABLET ORAL at 18:47

## 2024-05-04 RX ADMIN — IPRATROPIUM BROMIDE AND ALBUTEROL SULFATE 3 ML: 2.5; .5 SOLUTION RESPIRATORY (INHALATION) at 09:02

## 2024-05-04 RX ADMIN — LIDOCAINE 4% 1 PATCH: 40 PATCH TOPICAL at 18:46

## 2024-05-04 RX ADMIN — MORPHINE SULFATE 4 MG: 4 INJECTION, SOLUTION INTRAMUSCULAR; INTRAVENOUS at 08:36

## 2024-05-04 RX ADMIN — Medication 5 L/MIN: at 08:56

## 2024-05-04 SDOH — SOCIAL STABILITY: SOCIAL INSECURITY: HAS ANYONE EVER THREATENED TO HURT YOUR FAMILY OR YOUR PETS?: NO

## 2024-05-04 SDOH — SOCIAL STABILITY: SOCIAL INSECURITY: ARE YOU OR HAVE YOU BEEN THREATENED OR ABUSED PHYSICALLY, EMOTIONALLY, OR SEXUALLY BY ANYONE?: NO

## 2024-05-04 SDOH — SOCIAL STABILITY: SOCIAL INSECURITY: ABUSE: ADULT

## 2024-05-04 SDOH — SOCIAL STABILITY: SOCIAL INSECURITY: DOES ANYONE TRY TO KEEP YOU FROM HAVING/CONTACTING OTHER FRIENDS OR DOING THINGS OUTSIDE YOUR HOME?: NO

## 2024-05-04 SDOH — SOCIAL STABILITY: SOCIAL INSECURITY: WERE YOU ABLE TO COMPLETE ALL THE BEHAVIORAL HEALTH SCREENINGS?: YES

## 2024-05-04 SDOH — SOCIAL STABILITY: SOCIAL INSECURITY: HAVE YOU HAD ANY THOUGHTS OF HARMING ANYONE ELSE?: NO

## 2024-05-04 SDOH — SOCIAL STABILITY: SOCIAL INSECURITY: ARE THERE ANY APPARENT SIGNS OF INJURIES/BEHAVIORS THAT COULD BE RELATED TO ABUSE/NEGLECT?: NO

## 2024-05-04 SDOH — SOCIAL STABILITY: SOCIAL INSECURITY: DO YOU FEEL ANYONE HAS EXPLOITED OR TAKEN ADVANTAGE OF YOU FINANCIALLY OR OF YOUR PERSONAL PROPERTY?: NO

## 2024-05-04 SDOH — SOCIAL STABILITY: SOCIAL INSECURITY: DO YOU FEEL UNSAFE GOING BACK TO THE PLACE WHERE YOU ARE LIVING?: NO

## 2024-05-04 ASSESSMENT — COGNITIVE AND FUNCTIONAL STATUS - GENERAL
DAILY ACTIVITIY SCORE: 24
MOBILITY SCORE: 24
PATIENT BASELINE BEDBOUND: NO

## 2024-05-04 ASSESSMENT — COLUMBIA-SUICIDE SEVERITY RATING SCALE - C-SSRS
2. HAVE YOU ACTUALLY HAD ANY THOUGHTS OF KILLING YOURSELF?: NO
6. HAVE YOU EVER DONE ANYTHING, STARTED TO DO ANYTHING, OR PREPARED TO DO ANYTHING TO END YOUR LIFE?: NO
2. HAVE YOU ACTUALLY HAD ANY THOUGHTS OF KILLING YOURSELF?: NO
1. IN THE PAST MONTH, HAVE YOU WISHED YOU WERE DEAD OR WISHED YOU COULD GO TO SLEEP AND NOT WAKE UP?: NO
6. HAVE YOU EVER DONE ANYTHING, STARTED TO DO ANYTHING, OR PREPARED TO DO ANYTHING TO END YOUR LIFE?: NO
1. IN THE PAST MONTH, HAVE YOU WISHED YOU WERE DEAD OR WISHED YOU COULD GO TO SLEEP AND NOT WAKE UP?: NO

## 2024-05-04 ASSESSMENT — LIFESTYLE VARIABLES
HEADACHE, FULLNESS IN HEAD: NOT PRESENT
HAVE YOU EVER FELT YOU SHOULD CUT DOWN ON YOUR DRINKING: NO
AUDITORY DISTURBANCES: NOT PRESENT
PAROXYSMAL SWEATS: NO SWEAT VISIBLE
ORIENTATION AND CLOUDING OF SENSORIUM: ORIENTED AND CAN DO SERIAL ADDITIONS
TOTAL SCORE: 0
ANXIETY: NO ANXIETY, AT EASE
NAUSEA AND VOMITING: NO NAUSEA AND NO VOMITING
AGITATION: NORMAL ACTIVITY
TOTAL SCORE: 0
VISUAL DISTURBANCES: NOT PRESENT
ANXIETY: NO ANXIETY, AT EASE
VISUAL DISTURBANCES: NOT PRESENT
TOTAL SCORE: 0
HEADACHE, FULLNESS IN HEAD: NOT PRESENT
EVER HAD A DRINK FIRST THING IN THE MORNING TO STEADY YOUR NERVES TO GET RID OF A HANGOVER: NO
PAROXYSMAL SWEATS: NO SWEAT VISIBLE
TREMOR: NO TREMOR
HAVE PEOPLE ANNOYED YOU BY CRITICIZING YOUR DRINKING: NO
AUDITORY DISTURBANCES: NOT PRESENT
EVER FELT BAD OR GUILTY ABOUT YOUR DRINKING: NO
NAUSEA AND VOMITING: NO NAUSEA AND NO VOMITING
TREMOR: NO TREMOR
AGITATION: NORMAL ACTIVITY
ORIENTATION AND CLOUDING OF SENSORIUM: ORIENTED AND CAN DO SERIAL ADDITIONS

## 2024-05-04 ASSESSMENT — PAIN SCALES - GENERAL
PAINLEVEL_OUTOF10: 5 - MODERATE PAIN
PAINLEVEL_OUTOF10: 1
PAINLEVEL_OUTOF10: 2
PAINLEVEL_OUTOF10: 5 - MODERATE PAIN
PAINLEVEL_OUTOF10: 0 - NO PAIN
PAINLEVEL_OUTOF10: 0 - NO PAIN

## 2024-05-04 ASSESSMENT — ACTIVITIES OF DAILY LIVING (ADL)
ADEQUATE_TO_COMPLETE_ADL: YES
GROOMING: INDEPENDENT
PATIENT'S MEMORY ADEQUATE TO SAFELY COMPLETE DAILY ACTIVITIES?: YES
TOILETING: INDEPENDENT
WALKS IN HOME: INDEPENDENT
HEARING - LEFT EAR: FUNCTIONAL
FEEDING YOURSELF: INDEPENDENT
JUDGMENT_ADEQUATE_SAFELY_COMPLETE_DAILY_ACTIVITIES: YES
HEARING - RIGHT EAR: FUNCTIONAL
DRESSING YOURSELF: INDEPENDENT
BATHING: INDEPENDENT

## 2024-05-04 ASSESSMENT — PAIN DESCRIPTION - ORIENTATION
ORIENTATION: LEFT
ORIENTATION: LEFT

## 2024-05-04 ASSESSMENT — PAIN - FUNCTIONAL ASSESSMENT
PAIN_FUNCTIONAL_ASSESSMENT: 0-10

## 2024-05-04 ASSESSMENT — ENCOUNTER SYMPTOMS
MUSCULOSKELETAL NEGATIVE: 1
EYES NEGATIVE: 1
NEUROLOGICAL NEGATIVE: 1
CONSTITUTIONAL NEGATIVE: 1
PSYCHIATRIC NEGATIVE: 1
SHORTNESS OF BREATH: 1
GASTROINTESTINAL NEGATIVE: 1

## 2024-05-04 ASSESSMENT — PAIN DESCRIPTION - LOCATION
LOCATION: CHEST
LOCATION: ABDOMEN
LOCATION: ABDOMEN

## 2024-05-04 ASSESSMENT — PAIN DESCRIPTION - DESCRIPTORS: DESCRIPTORS: ACHING

## 2024-05-04 ASSESSMENT — PAIN DESCRIPTION - PROGRESSION: CLINICAL_PROGRESSION: RESOLVED

## 2024-05-04 ASSESSMENT — PAIN DESCRIPTION - PAIN TYPE: TYPE: ACUTE PAIN

## 2024-05-04 NOTE — ED PROVIDER NOTES
HPI   Chief Complaint   Patient presents with    Rib Injury     Pt fell Thursday night and is having L sided rib pain and sob, denies hitting his head, no blood thinners        A 73-year-old male patient comes in the emergency department today with complaints of left sided rib pain as well as shortness of breath.  States he was drinking on Thursday and fell striking his ribs against the countertop in the kitchen.  Unsure if he hit his head or not.  Denies any blood thinner use.  States he has COPD has oxygen at home.  Also has a O2 monitor which he says he has been having difficulty getting his oxygen up.  For this purpose comes in the emergency department today for the evaluation.  Otherwise no other complaints at present time.  Denies any associated fevers or chills.                          No data recorded                   Patient History   Past Medical History:   Diagnosis Date    COPD (chronic obstructive pulmonary disease) (Multi)     Hypertension      History reviewed. No pertinent surgical history.  No family history on file.  Social History     Tobacco Use    Smoking status: Every Day     Current packs/day: 1.00     Average packs/day: 1 pack/day for 55.0 years (55.0 ttl pk-yrs)     Types: Cigarettes    Smokeless tobacco: Never   Vaping Use    Vaping status: Never Used   Substance Use Topics    Alcohol use: Defer     Alcohol/week: 5.0 standard drinks of alcohol     Types: 5 Cans of beer per week    Drug use: Yes     Types: Marijuana       Physical Exam   ED Triage Vitals [05/04/24 0806]   Temperature Heart Rate Respirations BP   36.7 °C (98.1 °F) 99 18 144/81      Pulse Ox Temp Source Heart Rate Source Patient Position   (!) 78 % Temporal Monitor --      BP Location FiO2 (%)     -- --       Physical Exam  Constitutional:       General: He is in acute distress (Moderate).      Appearance: Normal appearance.   HENT:      Head: Normocephalic and atraumatic.      Nose: Nose normal.   Eyes:      Extraocular  Movements: Extraocular movements intact.      Conjunctiva/sclera: Conjunctivae normal.   Cardiovascular:      Rate and Rhythm: Normal rate and regular rhythm.   Pulmonary:      Effort: Respiratory distress present.      Breath sounds: No stridor. Wheezing present.   Abdominal:      General: Abdomen is flat.      Palpations: Abdomen is soft.   Musculoskeletal:         General: Tenderness (Tenderness palpation over the left lateral posterior ribs) present. Normal range of motion.      Cervical back: Normal range of motion.   Skin:     General: Skin is warm and dry.   Neurological:      General: No focal deficit present.      Mental Status: He is alert and oriented to person, place, and time. Mental status is at baseline.         ED Course & MDM   Diagnoses as of 05/04/24 1246   Fall, initial encounter   Hypoxemia   Closed fracture of multiple ribs of left side, initial encounter       Medical Decision Making  A 73-year-old male patient comes in the emergency department today with complaints of left sided rib pain as well as shortness of breath.  States he was drinking on Thursday and fell striking his ribs against the countertop in the kitchen.  Unsure if he hit his head or not.  Denies any blood thinner use.  States he has COPD has oxygen at home.  Also has a O2 monitor which he says he has been having difficulty getting his oxygen up.  For this purpose comes in the emergency department today for the evaluation.  Otherwise no other complaints at present time.  Denies any associated fevers or chills.    CT head, spine, chest abdomen pelvis is ordered as well as EKG, laboratory studies and chest x-ray.  Rule out ACS, arrhythmias, electrolyte abnormalities, leukocytosis with left shift.  Rule out pneumothorax, pulmonary congestion or pneumonia.  Rule out rib fractures, intrathoracic or intra-abdominal bleed, spinal injuries or intracranial hemorrhage.  IV morphine IV Zofran for pain and potential associated nausea as  well as nebulized albuterol DuoNeb, IV Solu-Medrol and IV magnesium.    Patient has negative troponin, negative for COVID-19 influenza RSV.  .  Patient's kidney function normal.  Magnesium normal lactate negative white blood cell count 17.5 absolute for count 15.38.  CT of the chest shows left-sided eighth and ninth rib fractures.  There is some mucous plugging as well as atelectasis.  Possible T12 endplate fracture which is either acute or subacute.  No pneumothorax.  Otherwise patient has negative workup here in the emergency department today.  Patient is requiring 5 L oxygen to get to 90%.  This is abnormal for the patient.  Secondary to this we will admit patient for multiple rib fractures, hypoxia    Historian is the patient    Diagnosis: Multiple rib fractures, hypoxia    I discussed case with Dr. Staton the hospitalist who agrees with the patient under her service      Labs Reviewed   CBC WITH AUTO DIFFERENTIAL - Abnormal       Result Value    WBC 17.5 (*)     nRBC 0.0      RBC 5.40      Hemoglobin 16.0      Hematocrit 46.9      MCV 87      MCH 29.6      MCHC 34.1      RDW 14.0      Platelets 431      Neutrophils % 88.0      Immature Granulocytes %, Automated 0.6      Lymphocytes % 3.0      Monocytes % 7.9      Eosinophils % 0.2      Basophils % 0.3      Neutrophils Absolute 15.38 (*)     Immature Granulocytes Absolute, Automated 0.10      Lymphocytes Absolute 0.53 (*)     Monocytes Absolute 1.39 (*)     Eosinophils Absolute 0.03      Basophils Absolute 0.06     COMPREHENSIVE METABOLIC PANEL - Abnormal    Glucose 116 (*)     Sodium 135 (*)     Potassium 3.9      Chloride 96 (*)     Bicarbonate 31      Anion Gap 12      Urea Nitrogen 13      Creatinine 0.99      eGFR 81      Calcium 10.7 (*)     Albumin 4.5      Alkaline Phosphatase 152 (*)     Total Protein 7.7      AST 16      Bilirubin, Total 0.8      ALT 16     B-TYPE NATRIURETIC PEPTIDE - Abnormal     (*)     Narrative:        <100 pg/mL -  Heart failure unlikely  100-299 pg/mL - Intermediate probability of acute heart                  failure exacerbation. Correlate with clinical                  context and patient history.    >=300 pg/mL - Heart Failure likely. Correlate with clinical                  context and patient history.    BNP testing is performed using different testing methodology at [facility] than at [facility]. Direct result comparisons should only be made within the same method.      MAGNESIUM - Normal    Magnesium 1.80     LACTATE - Normal    Lactate 1.2      Narrative:     Venipuncture immediately after or during the administration of Metamizole may lead to falsely low results. Testing should be performed immediately  prior to Metamizole dosing.   SARS-COV-2 PCR - Normal    Coronavirus 2019, PCR Not Detected      Narrative:     This assay has received FDA Emergency Use Authorization (EUA) and is only authorized for the duration of time that circumstances exist to justify the authorization of the emergency use of in vitro diagnostic tests for the detection of SARS-CoV-2 virus and/or diagnosis of COVID-19 infection under section 564(b)(1) of the Act, 21 U.S.C. 360bbb-3(b)(1). This assay is an in vitro diagnostic nucleic acid amplification test for the qualitative detection of SARS-CoV-2 from nasopharyngeal specimens and has been validated for use at [facility]. Negative results do not preclude COVID-19 infections and should not be used as the sole basis for diagnosis, treatment, or other management decisions.     INFLUENZA A AND B PCR - Normal    Flu A Result Not Detected      Flu B Result Not Detected      Narrative:     This assay is an in vitro diagnostic multiplex nucleic acid amplification test for the detection and discrimination of Influenza A & B from nasopharyngeal specimens, and has been validated for use at [facility]. Negative results do not  preclude Influenza A/B infections, and should not be used as the sole basis for diagnosis, treatment, or other management decisions. If Influenza A/B and RSV PCR results are negative, testing for Parainfluenza virus, Adenovirus and Metapneumovirus is routinely performed for [facility] pediatric oncology and intensive care inpatients, and is available on other patients by placing an add-on request.   RSV PCR - Normal    RSV PCR Not Detected      Narrative:     This assay is an FDA-cleared, in vitro diagnostic nucleic acid amplification test for the detection of RSV from nasopharyngeal specimens, and has been validated for use at [facility]. Negative results do not preclude RSV infections, and should not be used as the sole basis for diagnosis, treatment, or other management decisions. If Influenza A/B and RSV PCR results are negative, testing for Parainfluenza virus, Adenovirus and Metapneumovirus is routinely performed for pediatric oncology and intensive care inpatients at [facility], and is available on other patients by placing an add-on request.       SERIAL TROPONIN-INITIAL - Normal    Troponin I, High Sensitivity 11      Narrative:     Less than 99th percentile of normal range cutoff-  Female and children under 18 years old <14 ng/L; Male <21 ng/L: Negative  Repeat testing should be performed if clinically indicated.     Female and children under 18 years old 14-50 ng/L; Male 21-50 ng/L:  Consistent with possible cardiac damage and possible increased clinical   risk. Serial measurements may help to assess extent of myocardial damage.     >50 ng/L: Consistent with cardiac damage, increased clinical risk and  myocardial infarction. Serial measurements may help assess extent of   myocardial damage.      NOTE: Children less than 1 year old may have higher baseline troponin   levels and results should be interpreted in conjunction with the overall   clinical context.     NOTE: Troponin I testing is  performed using a different   testing methodology at [facility] than at other   [facility]. Direct result comparisons should only   be made within the same method.   SERIAL TROPONIN, 1 HOUR - Normal    Troponin I, High Sensitivity 12      Narrative:     Less than 99th percentile of normal range cutoff-  Female and children under 18 years old <14 ng/L; Male <21 ng/L: Negative  Repeat testing should be performed if clinically indicated.     Female and children under 18 years old 14-50 ng/L; Male 21-50 ng/L:  Consistent with possible cardiac damage and possible increased clinical   risk. Serial measurements may help to assess extent of myocardial damage.     >50 ng/L: Consistent with cardiac damage, increased clinical risk and  myocardial infarction. Serial measurements may help assess extent of   myocardial damage.      NOTE: Children less than 1 year old may have higher baseline troponin   levels and results should be interpreted in conjunction with the overall   clinical context.     NOTE: Troponin I testing is performed using a different   testing methodology at [facility] than at [facility]. Direct result comparisons should only   be made within the same method.   TROPONIN SERIES- (INITIAL, 1 HR)    Narrative:     The following orders were created for panel order Troponin I Series, High Sensitivity (0, 1 HR).  Procedure                               Abnormality         Status                     ---------                               -----------         ------                     Troponin I, High Sensiti...[[id number]]  Normal              Final result               Troponin, High Sensitivi...[[id number]]  Normal              Final result                 Please view results for these tests on the individual orders.        CT chest abdomen pelvis w IV contrast   Final Result   CHEST   1.  There are acute fractures of the left-sided 8th and 9th ribs. No   overt  associated complications.   2. There is mucous plugging in the right bronchus intermedius   extending into the middle and lower lobe bronchi. There is associated   partial atelectasis of the right middle lobe and complete atelectasis   of the basal segments of the right lower lobe.   3. The minimal superior endplate fracture at T12 is again noted.   4. There is only trace left pleural effusion. There is no   pneumothorax.        ABDOMEN - PELVIS   1.  No definite acute abdomen or pelvis finding.             MACRO:   None        Signed by: Joseph Schoenberger 5/4/2024 10:36 AM   Dictation workstation:   [id number]      CT head wo IV contrast   Final Result   No evidence of acute cortical infarct or intracranial hemorrhage.        No evidence of intracranial hemorrhage or displaced skull fracture.        MACRO:   None        Signed by: Joseph Schoenberger 5/4/2024 10:05 AM   Dictation workstation:   [id number]      CT cervical spine wo IV contrast   Final Result   Severe multilevel cervical spondylosis. No acute fracture or   subluxation.        MACRO:   None        Signed by: Joseph Schoenberger 5/4/2024 10:08 AM   Dictation workstation:   [id number]      CT thoracic spine wo IV contrast   Final Result   There is a possible new acute mild superior endplate fracture at the   T12 level paralleling the anterior endplate extending to the right   anterior corner.        There are interval several mild compression fractures without overt   acute fracture lines on thin-section axial images and there are   discussed in detail above.        No overt compromise the spinal canal on this exam.        MACRO:   None        Signed by: Joseph Schoenberger 5/4/2024 10:19 AM   Dictation workstation:   [id number]      CT lumbar spine wo IV contrast   Final Result   The mild superior endplate fracture at the T12 level is again noted.   No other fracture or no acute subluxation or compromise the spinal   canal on this exam.         MACRO:   None        Signed by: Joseph Schoenberger 5/4/2024 10:24 AM   Dictation workstation:   [id number]      XR chest 1 view   Final Result   1.  New elevation right hemidiaphragm at least partial atelectasis of   the right lower lobe with volume loss the right hemithorax. See   discussion above                  MACRO:   None        Signed by: Joseph Schoenberger 5/4/2024 8:46 AM   Dictation workstation:   [id number]            Procedure  ECG 12 lead    Performed by: Jason Ramesh PA-C  Authorized by: Jason Ramesh PA-C    Interpretation:     Details:  My EKG interpretation  Rate:     ECG rate:  91  Rhythm:     Rhythm: sinus rhythm    Ectopy:     Ectopy: PVCs    ST segments:     ST segments:  Normal  T waves:     T waves: normal         Jason Ramesh PA-C  05/04/24 1246

## 2024-05-04 NOTE — H&P
History Of Present Illness  Elton Lilly is a 73 y.o. male with Past medical history of hypertension, COPD on 2 L oxygen at home, CAD, hyperlipidemia who presented to the ED with worsening shortness of breath .  Patient had a fall Thursday night mechanical fall landed on the left side and was having left-sided rib pain.  Patient has COPD uses 2 L of oxygen at home and was having difficulty getting his oxygen above 90 and was saturating in the 70s at home so decided to come to the ED.  Also reported left-sided rib pain and shortness of breath.  No fever or chills.  Patient reported that he was drinking prior to the fall on Thursday and was smoking weed.  Still smokes cigarettes.  Reports he daily drinks couple of beers and few shots but denies having mild withdrawal wall in the past.  His last drink was 2 days ago.    Upon presentation to the ED vital signs include blood pressure 139/88, saturation was in the 80s up to 85 and was placed on nasal cannula oxygen currently on 4 L saturating 90%, labs tested negative for influenza COVID and RSV troponin 2 times negative CBC leukocytosis 17.5 otherwise unremarkable on CMP sodium 135 otherwise unremarkable hypercalcemia 10.7 magnesium 1.8 lactate was normal  on imaging chest x-ray showed new elevation of right hemidiaphragm with atelectasis of right lower lobe with suspected CT head was negative for acute finding CT cervical thoracic and lumbar spine was done showed multifocal cervical spondylosis with no acute fracture and cervical CT thoracic and lumbar showed possible new acute superior endplate fracture at T12 serology compression fractures without overt acute fracture CT chest abdomen pelvis was done and showed fracture of the left side of the rib eighth and ninth mucous plugging in the right bronchus extending to middle and lower lobe bronchi with atelectasis of right middle lobe and complete atelectasis of the basal segments of the right lower lobe  Patient  was admitted for acute hypoxic respiratory failure COPD     Past Medical History  Past Medical History:   Diagnosis Date    COPD (chronic obstructive pulmonary disease) (Multi)     Hypertension        Surgical History  History reviewed. No pertinent surgical history.     Social History  He reports that he has been smoking cigarettes. He has a 55 pack-year smoking history. He has never used smokeless tobacco. Alcohol use questions deferred to the physician. He reports current drug use. Drug: Marijuana.    Family History  No family history on file.     Allergies  Patient has no known allergies.    Review of Systems   Constitutional: Negative.    HENT: Negative.     Eyes: Negative.    Respiratory:  Positive for shortness of breath.    Cardiovascular:  Positive for chest pain.   Gastrointestinal: Negative.    Genitourinary: Negative.    Musculoskeletal: Negative.    Skin: Negative.    Neurological: Negative.    Psychiatric/Behavioral: Negative.          Physical Exam  Constitutional:       General: He is not in acute distress.     Appearance: Normal appearance. He is not ill-appearing, toxic-appearing or diaphoretic.   HENT:      Head: Normocephalic and atraumatic.      Mouth/Throat:      Mouth: Mucous membranes are moist.      Pharynx: No oropharyngeal exudate.   Eyes:      Extraocular Movements: Extraocular movements intact.      Pupils: Pupils are equal, round, and reactive to light.   Cardiovascular:      Rate and Rhythm: Normal rate and regular rhythm.      Heart sounds: No murmur heard.  Pulmonary:      Effort: Pulmonary effort is normal. No respiratory distress.      Breath sounds: Normal breath sounds. No wheezing.   Abdominal:      General: Abdomen is flat. Bowel sounds are normal. There is no distension.      Tenderness: There is no abdominal tenderness. There is no guarding or rebound.   Musculoskeletal:         General: No swelling.      Right lower leg: No edema.      Left lower leg: No edema.   Skin:      "Findings: No lesion or rash.   Neurological:      General: No focal deficit present.      Mental Status: He is alert and oriented to person, place, and time.      Cranial Nerves: No cranial nerve deficit.      Motor: No weakness.   Psychiatric:         Mood and Affect: Mood normal.         Behavior: Behavior normal.          Last Recorded Vitals  Blood pressure 135/88, pulse 82, temperature 36.7 °C (98.1 °F), temperature source Temporal, resp. rate 16, height 1.626 m (5' 4\"), weight 72.6 kg (160 lb), SpO2 (!) 89%.    Relevant Results        Results for orders placed or performed during the hospital encounter of 05/04/24 (from the past 24 hour(s))   ECG 12 lead   Result Value Ref Range    Ventricular Rate 91 BPM    Atrial Rate 91 BPM    WV Interval 160 ms    QRS Duration 84 ms    QT Interval 326 ms    QTC Calculation(Bazett) 400 ms    P Axis 66 degrees    R Axis 61 degrees    T Axis 78 degrees    QRS Count 15 beats    Q Onset 222 ms    P Onset 142 ms    P Offset 210 ms    T Offset 385 ms    QTC Fredericia 374 ms   Sars-CoV-2 PCR   Result Value Ref Range    Coronavirus 2019, PCR Not Detected Not Detected   Influenza A, and B PCR   Result Value Ref Range    Flu A Result Not Detected Not Detected    Flu B Result Not Detected Not Detected   RSV PCR   Result Value Ref Range    RSV PCR Not Detected Not Detected   CBC and Auto Differential   Result Value Ref Range    WBC 17.5 (H) 4.4 - 11.3 x10*3/uL    nRBC 0.0 0.0 - 0.0 /100 WBCs    RBC 5.40 4.50 - 5.90 x10*6/uL    Hemoglobin 16.0 13.5 - 17.5 g/dL    Hematocrit 46.9 41.0 - 52.0 %    MCV 87 80 - 100 fL    MCH 29.6 26.0 - 34.0 pg    MCHC 34.1 32.0 - 36.0 g/dL    RDW 14.0 11.5 - 14.5 %    Platelets 431 150 - 450 x10*3/uL    Neutrophils % 88.0 40.0 - 80.0 %    Immature Granulocytes %, Automated 0.6 0.0 - 0.9 %    Lymphocytes % 3.0 13.0 - 44.0 %    Monocytes % 7.9 2.0 - 10.0 %    Eosinophils % 0.2 0.0 - 6.0 %    Basophils % 0.3 0.0 - 2.0 %    Neutrophils Absolute 15.38 (H) 1.60 " - 5.50 x10*3/uL    Immature Granulocytes Absolute, Automated 0.10 0.00 - 0.50 x10*3/uL    Lymphocytes Absolute 0.53 (L) 0.80 - 3.00 x10*3/uL    Monocytes Absolute 1.39 (H) 0.05 - 0.80 x10*3/uL    Eosinophils Absolute 0.03 0.00 - 0.40 x10*3/uL    Basophils Absolute 0.06 0.00 - 0.10 x10*3/uL   Comprehensive Metabolic Panel   Result Value Ref Range    Glucose 116 (H) 74 - 99 mg/dL    Sodium 135 (L) 136 - 145 mmol/L    Potassium 3.9 3.5 - 5.3 mmol/L    Chloride 96 (L) 98 - 107 mmol/L    Bicarbonate 31 21 - 32 mmol/L    Anion Gap 12 10 - 20 mmol/L    Urea Nitrogen 13 6 - 23 mg/dL    Creatinine 0.99 0.50 - 1.30 mg/dL    eGFR 81 >60 mL/min/1.73m*2    Calcium 10.7 (H) 8.6 - 10.3 mg/dL    Albumin 4.5 3.4 - 5.0 g/dL    Alkaline Phosphatase 152 (H) 33 - 136 U/L    Total Protein 7.7 6.4 - 8.2 g/dL    AST 16 9 - 39 U/L    Bilirubin, Total 0.8 0.0 - 1.2 mg/dL    ALT 16 10 - 52 U/L   Magnesium   Result Value Ref Range    Magnesium 1.80 1.60 - 2.40 mg/dL   Lactate   Result Value Ref Range    Lactate 1.2 0.4 - 2.0 mmol/L   B-Type Natriuretic Peptide   Result Value Ref Range     (H) 0 - 99 pg/mL   Troponin I, High Sensitivity, Initial   Result Value Ref Range    Troponin I, High Sensitivity 11 0 - 20 ng/L   Light Blue Top   Result Value Ref Range    Extra Tube Hold for add-ons.    Troponin, High Sensitivity, 1 Hour   Result Value Ref Range    Troponin I, High Sensitivity 12 0 - 20 ng/L          Assessment/Plan   Active Problems:  There are no active Hospital Problems.    #Acute on chronic hypoxic respiratory failure  #COPD with acute exacerbation  #Left eighth and ninth rib fracture  #Right lower lung atelectasis collapse mucous plug  #Status post fall    Continue oxygen supplement per nasal cannula  Titrate down as tolerated  Incentive spirometry  Continue Solu-Medrol DuoNeb nebulization  Continue antibiotics  Mucomyst with nebulization  Chest therapy    #History of CAD  #History of hyperlipidemia and hypertension  Continue  home medications    #Daily alcohol use  #Marijuana use  #Current active smoker  Nicotine patch  [facility] protocol  Ativan per [facility] protocol  Thiamine and folate       DVT prophylaxis on Lovenox      Mikaela Conner MD

## 2024-05-05 LAB
ANION GAP SERPL CALC-SCNC: 11 MMOL/L (ref 10–20)
BUN SERPL-MCNC: 21 MG/DL (ref 6–23)
CALCIUM SERPL-MCNC: 9.4 MG/DL (ref 8.6–10.3)
CHLORIDE SERPL-SCNC: 97 MMOL/L (ref 98–107)
CO2 SERPL-SCNC: 29 MMOL/L (ref 21–32)
CREAT SERPL-MCNC: 1.01 MG/DL (ref 0.5–1.3)
EGFRCR SERPLBLD CKD-EPI 2021: 80 ML/MIN/1.73M*2
ERYTHROCYTE [DISTWIDTH] IN BLOOD BY AUTOMATED COUNT: 14.1 % (ref 11.5–14.5)
GLUCOSE SERPL-MCNC: 148 MG/DL (ref 74–99)
HCT VFR BLD AUTO: 42.9 % (ref 41–52)
HGB BLD-MCNC: 14.6 G/DL (ref 13.5–17.5)
HOLD SPECIMEN: NORMAL
MCH RBC QN AUTO: 29.4 PG (ref 26–34)
MCHC RBC AUTO-ENTMCNC: 34 G/DL (ref 32–36)
MCV RBC AUTO: 86 FL (ref 80–100)
NRBC BLD-RTO: 0 /100 WBCS (ref 0–0)
PLATELET # BLD AUTO: 366 X10*3/UL (ref 150–450)
POTASSIUM SERPL-SCNC: 4.1 MMOL/L (ref 3.5–5.3)
RBC # BLD AUTO: 4.97 X10*6/UL (ref 4.5–5.9)
SODIUM SERPL-SCNC: 133 MMOL/L (ref 136–145)
WBC # BLD AUTO: 15.2 X10*3/UL (ref 4.4–11.3)

## 2024-05-05 PROCEDURE — 2500000004 HC RX 250 GENERAL PHARMACY W/ HCPCS (ALT 636 FOR OP/ED): Performed by: STUDENT IN AN ORGANIZED HEALTH CARE EDUCATION/TRAINING PROGRAM

## 2024-05-05 PROCEDURE — 85027 COMPLETE CBC AUTOMATED: CPT | Performed by: STUDENT IN AN ORGANIZED HEALTH CARE EDUCATION/TRAINING PROGRAM

## 2024-05-05 PROCEDURE — 94668 MNPJ CHEST WALL SBSQ: CPT

## 2024-05-05 PROCEDURE — 2500000005 HC RX 250 GENERAL PHARMACY W/O HCPCS: Performed by: STUDENT IN AN ORGANIZED HEALTH CARE EDUCATION/TRAINING PROGRAM

## 2024-05-05 PROCEDURE — 1200000002 HC GENERAL ROOM WITH TELEMETRY DAILY

## 2024-05-05 PROCEDURE — 84145 PROCALCITONIN (PCT): CPT | Mod: ELYLAB | Performed by: STUDENT IN AN ORGANIZED HEALTH CARE EDUCATION/TRAINING PROGRAM

## 2024-05-05 PROCEDURE — 99232 SBSQ HOSP IP/OBS MODERATE 35: CPT | Performed by: STUDENT IN AN ORGANIZED HEALTH CARE EDUCATION/TRAINING PROGRAM

## 2024-05-05 PROCEDURE — 36415 COLL VENOUS BLD VENIPUNCTURE: CPT | Performed by: STUDENT IN AN ORGANIZED HEALTH CARE EDUCATION/TRAINING PROGRAM

## 2024-05-05 PROCEDURE — 80048 BASIC METABOLIC PNL TOTAL CA: CPT | Performed by: STUDENT IN AN ORGANIZED HEALTH CARE EDUCATION/TRAINING PROGRAM

## 2024-05-05 PROCEDURE — 2500000006 HC RX 250 W HCPCS SELF ADMINISTERED DRUGS (ALT 637 FOR ALL PAYERS): Mod: MUE | Performed by: STUDENT IN AN ORGANIZED HEALTH CARE EDUCATION/TRAINING PROGRAM

## 2024-05-05 PROCEDURE — 2500000002 HC RX 250 W HCPCS SELF ADMINISTERED DRUGS (ALT 637 FOR MEDICARE OP, ALT 636 FOR OP/ED): Performed by: STUDENT IN AN ORGANIZED HEALTH CARE EDUCATION/TRAINING PROGRAM

## 2024-05-05 PROCEDURE — 2500000001 HC RX 250 WO HCPCS SELF ADMINISTERED DRUGS (ALT 637 FOR MEDICARE OP): Performed by: STUDENT IN AN ORGANIZED HEALTH CARE EDUCATION/TRAINING PROGRAM

## 2024-05-05 PROCEDURE — 94640 AIRWAY INHALATION TREATMENT: CPT

## 2024-05-05 RX ADMIN — Medication 1 TABLET: at 08:01

## 2024-05-05 RX ADMIN — TIOTROPIUM BROMIDE INHALATION SPRAY 2 PUFF: 3.12 SPRAY, METERED RESPIRATORY (INHALATION) at 08:00

## 2024-05-05 RX ADMIN — ACETYLCYSTEINE 600 MG: 200 SOLUTION ORAL; RESPIRATORY (INHALATION) at 10:57

## 2024-05-05 RX ADMIN — IPRATROPIUM BROMIDE AND ALBUTEROL SULFATE 3 ML: 2.5; .5 SOLUTION RESPIRATORY (INHALATION) at 15:31

## 2024-05-05 RX ADMIN — ENOXAPARIN SODIUM 40 MG: 40 INJECTION SUBCUTANEOUS at 15:32

## 2024-05-05 RX ADMIN — VALSARTAN 80 MG: 80 TABLET, FILM COATED ORAL at 08:01

## 2024-05-05 RX ADMIN — IPRATROPIUM BROMIDE AND ALBUTEROL SULFATE 3 ML: 2.5; .5 SOLUTION RESPIRATORY (INHALATION) at 23:48

## 2024-05-05 RX ADMIN — OXYCODONE HYDROCHLORIDE 5 MG: 5 TABLET ORAL at 20:07

## 2024-05-05 RX ADMIN — ATORVASTATIN CALCIUM 40 MG: 20 TABLET, FILM COATED ORAL at 20:07

## 2024-05-05 RX ADMIN — METHYLPREDNISOLONE SODIUM SUCCINATE 40 MG: 40 INJECTION, POWDER, FOR SOLUTION INTRAMUSCULAR; INTRAVENOUS at 03:53

## 2024-05-05 RX ADMIN — ACETYLCYSTEINE 600 MG: 200 SOLUTION ORAL; RESPIRATORY (INHALATION) at 15:30

## 2024-05-05 RX ADMIN — METHYLPREDNISOLONE SODIUM SUCCINATE 40 MG: 40 INJECTION, POWDER, FOR SOLUTION INTRAMUSCULAR; INTRAVENOUS at 15:32

## 2024-05-05 RX ADMIN — AMLODIPINE BESYLATE 10 MG: 5 TABLET ORAL at 08:01

## 2024-05-05 RX ADMIN — Medication 5 L/MIN: at 20:00

## 2024-05-05 RX ADMIN — FOLIC ACID 1 MG: 1 TABLET ORAL at 08:01

## 2024-05-05 RX ADMIN — ACETYLCYSTEINE 600 MG: 200 SOLUTION ORAL; RESPIRATORY (INHALATION) at 20:52

## 2024-05-05 RX ADMIN — ACETYLCYSTEINE 600 MG: 200 SOLUTION ORAL; RESPIRATORY (INHALATION) at 07:33

## 2024-05-05 RX ADMIN — FAMOTIDINE 20 MG: 20 TABLET, FILM COATED ORAL at 08:01

## 2024-05-05 RX ADMIN — Medication 100 MG: at 08:01

## 2024-05-05 RX ADMIN — LEVOFLOXACIN 750 MG: 5 INJECTION, SOLUTION INTRAVENOUS at 15:32

## 2024-05-05 RX ADMIN — IPRATROPIUM BROMIDE AND ALBUTEROL SULFATE 3 ML: 2.5; .5 SOLUTION RESPIRATORY (INHALATION) at 03:59

## 2024-05-05 RX ADMIN — LIDOCAINE 4% 1 PATCH: 40 PATCH TOPICAL at 08:01

## 2024-05-05 RX ADMIN — IPRATROPIUM BROMIDE AND ALBUTEROL SULFATE 3 ML: 2.5; .5 SOLUTION RESPIRATORY (INHALATION) at 10:57

## 2024-05-05 RX ADMIN — FAMOTIDINE 20 MG: 20 TABLET, FILM COATED ORAL at 20:07

## 2024-05-05 RX ADMIN — MAGNESIUM OXIDE 400 MG (241.3 MG MAGNESIUM) TABLET 400 MG: TABLET at 15:32

## 2024-05-05 RX ADMIN — IPRATROPIUM BROMIDE AND ALBUTEROL SULFATE 3 ML: 2.5; .5 SOLUTION RESPIRATORY (INHALATION) at 20:52

## 2024-05-05 RX ADMIN — IPRATROPIUM BROMIDE AND ALBUTEROL SULFATE 3 ML: 2.5; .5 SOLUTION RESPIRATORY (INHALATION) at 07:33

## 2024-05-05 RX ADMIN — Medication 5 L/MIN: at 07:00

## 2024-05-05 ASSESSMENT — PAIN SCALES - GENERAL
PAINLEVEL_OUTOF10: 0 - NO PAIN
PAINLEVEL_OUTOF10: 7
PAINLEVEL_OUTOF10: 0 - NO PAIN

## 2024-05-05 ASSESSMENT — PAIN DESCRIPTION - LOCATION: LOCATION: RIB CAGE

## 2024-05-05 ASSESSMENT — LIFESTYLE VARIABLES
ANXIETY: NO ANXIETY, AT EASE
TREMOR: NO TREMOR
ORIENTATION AND CLOUDING OF SENSORIUM: ORIENTED AND CAN DO SERIAL ADDITIONS
PAROXYSMAL SWEATS: NO SWEAT VISIBLE
AGITATION: NORMAL ACTIVITY
ORIENTATION AND CLOUDING OF SENSORIUM: ORIENTED AND CAN DO SERIAL ADDITIONS
TOTAL SCORE: 0
HEADACHE, FULLNESS IN HEAD: NOT PRESENT
ORIENTATION AND CLOUDING OF SENSORIUM: ORIENTED AND CAN DO SERIAL ADDITIONS
NAUSEA AND VOMITING: NO NAUSEA AND NO VOMITING
ANXIETY: NO ANXIETY, AT EASE
NAUSEA AND VOMITING: NO NAUSEA AND NO VOMITING
NAUSEA AND VOMITING: NO NAUSEA AND NO VOMITING
PAROXYSMAL SWEATS: NO SWEAT VISIBLE
AUDITORY DISTURBANCES: NOT PRESENT
TOTAL SCORE: 0
VISUAL DISTURBANCES: NOT PRESENT
VISUAL DISTURBANCES: NOT PRESENT
ANXIETY: NO ANXIETY, AT EASE
AUDITORY DISTURBANCES: NOT PRESENT
AGITATION: NORMAL ACTIVITY
VISUAL DISTURBANCES: NOT PRESENT
TREMOR: NO TREMOR
HEADACHE, FULLNESS IN HEAD: NOT PRESENT
TREMOR: NO TREMOR
HEADACHE, FULLNESS IN HEAD: NOT PRESENT
TREMOR: NO TREMOR
PAROXYSMAL SWEATS: NO SWEAT VISIBLE
AUDITORY DISTURBANCES: NOT PRESENT
HEADACHE, FULLNESS IN HEAD: NOT PRESENT
VISUAL DISTURBANCES: NOT PRESENT
AUDITORY DISTURBANCES: NOT PRESENT
AGITATION: NORMAL ACTIVITY
ORIENTATION AND CLOUDING OF SENSORIUM: ORIENTED AND CAN DO SERIAL ADDITIONS
ANXIETY: NO ANXIETY, AT EASE
PAROXYSMAL SWEATS: NO SWEAT VISIBLE
AGITATION: NORMAL ACTIVITY
TOTAL SCORE: 0
NAUSEA AND VOMITING: NO NAUSEA AND NO VOMITING
TOTAL SCORE: 0

## 2024-05-05 ASSESSMENT — COGNITIVE AND FUNCTIONAL STATUS - GENERAL
MOBILITY SCORE: 24
MOBILITY SCORE: 24
DAILY ACTIVITIY SCORE: 24
MOBILITY SCORE: 24

## 2024-05-05 ASSESSMENT — PAIN - FUNCTIONAL ASSESSMENT: PAIN_FUNCTIONAL_ASSESSMENT: 0-10

## 2024-05-05 ASSESSMENT — PAIN DESCRIPTION - ORIENTATION: ORIENTATION: LEFT

## 2024-05-05 NOTE — PROGRESS NOTES
"Daily progress note    Elton Lilly is 73 y.o. male with Past medical history of hypertension, COPD on 2 L oxygen at home, CAD, hyperlipidemia who presented to the ED with worsening shortness of breath .  Was admitted with a diagnosis of acute COPD exacerbation, right sided mucous plug    Subjective  Patient currently on 5 L of oxygen per nasal cannula  Titrate down oxygen as tolerated        Vital signs in last 24 hours:  Temp:  [36.3 °C (97.3 °F)-36.7 °C (98.1 °F)] 36.3 °C (97.3 °F)  Heart Rate:  [68-93] 93  Resp:  [16] 16  BP: (103-137)/(61-89) 103/61  FiO2 (%):  [40 %-45 %] 40 %  /61 (BP Location: Right arm, Patient Position: Lying)   Pulse 93   Temp 36.3 °C (97.3 °F) (Temporal)   Resp 16   Ht 1.626 m (5' 4\")   Wt 72.6 kg (160 lb)   SpO2 92%   BMI 27.46 kg/m²    Intake/Output last 3 shifts:  I/O last 3 completed shifts:  In: 150 (2.1 mL/kg) [IV Piggyback:150]  Out: - (0 mL/kg)   Weight: 72.6 kg   Intake/Output this shift:  I/O this shift:  In: 50 [I.V.:50]  Out: -     Physical Exam  Constitutional:       General: He is not in acute distress.     Appearance: Normal appearance. He is not ill-appearing, toxic-appearing or diaphoretic.   HENT:      Head: Normocephalic and atraumatic.      Mouth/Throat:      Mouth: Mucous membranes are moist.      Pharynx: No oropharyngeal exudate.   Eyes:      Extraocular Movements: Extraocular movements intact.      Pupils: Pupils are equal, round, and reactive to light.   Cardiovascular:      Rate and Rhythm: Normal rate and regular rhythm.      Heart sounds: No murmur heard.  Pulmonary:      Effort: Pulmonary effort is normal. No respiratory distress.      Breath sounds: Wheezing present.      Comments: Diffuse wheezing  Diminished air entry over the right lower posterior chest  Abdominal:      General: Abdomen is flat. Bowel sounds are normal. There is no distension.      Tenderness: There is no abdominal tenderness. There is no guarding or rebound. "   Musculoskeletal:         General: No swelling.      Right lower leg: No edema.      Left lower leg: No edema.   Skin:     Findings: No lesion or rash.   Neurological:      General: No focal deficit present.      Mental Status: He is alert and oriented to person, place, and time.      Cranial Nerves: No cranial nerve deficit.      Motor: No weakness.   Psychiatric:         Mood and Affect: Mood normal.         Behavior: Behavior normal.         Current medication   Current Facility-Administered Medications   Medication Dose Route Frequency Provider Last Rate Last Admin    acetaminophen (Tylenol) tablet 650 mg  650 mg oral q4h PRN Mikaela Conner MD        Or    acetaminophen (Tylenol) oral liquid 650 mg  650 mg nasogastric tube q4h PRN Mikaela Conner MD        Or    acetaminophen (Tylenol) suppository 650 mg  650 mg rectal q4h PRN Mikaela Conner MD        acetaminophen (Tylenol) tablet 650 mg  650 mg oral q4h PRN Mikaela Conner MD        Or    acetaminophen (Tylenol) oral liquid 650 mg  650 mg oral q4h PRN Mikaela Conner MD        Or    acetaminophen (Tylenol) suppository 650 mg  650 mg rectal q4h PRN Mikaela Conner MD        acetylcysteine (Mucomyst) 200 mg/mL (20 %) nebulizer solution 600 mg  3 mL nebulization 4x daily Mikaela Conner MD   600 mg at 05/05/24 1057    amLODIPine (Norvasc) tablet 10 mg  10 mg oral Daily Mikaela Conner MD   10 mg at 05/05/24 0801    atorvastatin (Lipitor) tablet 40 mg  40 mg oral Nightly Mikaela Conner MD   40 mg at 05/04/24 2046    enoxaparin (Lovenox) syringe 40 mg  40 mg subcutaneous q24h Mikaela Conner MD        famotidine (Pepcid) tablet 20 mg  20 mg oral BID Mikaela Conner MD   20 mg at 05/05/24 0801    Or    famotidine PF (Pepcid) injection 20 mg  20 mg intravenous BID Mikaela Conner MD        folic acid (Folvite) tablet 1 mg  1 mg oral Daily Mikaela Conner MD   1 mg at 05/05/24 0801    ipratropium-albuteroL (Duo-Neb) 0.5-2.5  mg/3 mL nebulizer solution 3 mL  3 mL nebulization q4h Mikaela Conner MD   3 mL at 05/05/24 1057    levoFLOXacin (Levaquin)  mg  750 mg intravenous q24h Mikaela Conner MD   Stopped at 05/04/24 1743    lidocaine 4 % patch 1 patch  1 patch transdermal Daily Mikaela Conner MD   1 patch at 05/05/24 0801    LORazepam (Ativan) tablet 0.5 mg  0.5 mg oral q2h PRN Mikaela Conner MD        Or    LORazepam (Ativan) tablet 1 mg  1 mg oral q2h PRN Mikaela Conner MD        Or    LORazepam (Ativan) tablet 2 mg  2 mg oral q2h PRN Mikaela Conner MD        magnesium oxide (Mag-Ox) tablet 400 mg  400 mg oral q24h Mikaela Conner MD   400 mg at 05/04/24 1843    melatonin tablet 3 mg  3 mg oral Nightly PRN Mikaela Conner MD        methylPREDNISolone sod succinate (SOLU-Medrol) 40 mg/mL injection 40 mg  40 mg intravenous q12h Mikaela Conner MD   40 mg at 05/05/24 0353    multivitamin with minerals 1 tablet  1 tablet oral Daily Mikaela Conner MD   1 tablet at 05/05/24 0801    nicotine (Nicoderm CQ) 14 mg/24 hr patch 1 patch  1 patch transdermal Daily Mikaela Conner MD        ondansetron (Zofran) tablet 4 mg  4 mg oral q8h PRN Mikaela Conner MD        Or    ondansetron (Zofran) injection 4 mg  4 mg intravenous q8h PRN Mikaela Conner MD        oxyCODONE (Roxicodone) immediate release tablet 5 mg  5 mg oral q6h PRN Mikaela Conner MD   5 mg at 05/04/24 1847    oxygen (O2) therapy   inhalation Continuous Jason Ramesh PA-C 300,000 mL/hr at 05/04/24 0902 5 L/min at 05/04/24 0902    oxygen (O2) therapy   inhalation Continuous - Inhalation Mikaela Conner MD   5 L/min at 05/05/24 0700    polyethylene glycol (Glycolax, Miralax) packet 17 g  17 g oral Daily PRN Mikaela Conner MD        thiamine (Vitamin B-1) tablet 100 mg  100 mg oral Daily Mikaela Conner MD   100 mg at 05/05/24 0801    tiotropium (Spiriva Respimat) 2.5 mcg/actuation inhaler 2 puff  2 puff inhalation Daily  Mikaela Conner MD   2 puff at 05/05/24 0800    valsartan (Diovan) tablet 80 mg  80 mg oral Daily Mikaela Conner MD   80 mg at 05/05/24 0801        Labs  Lab Results   Component Value Date    WBC 15.2 (H) 05/05/2024    HGB 14.6 05/05/2024    HCT 42.9 05/05/2024    MCV 86 05/05/2024     05/05/2024     Lab Results   Component Value Date    HGBA1C CANCELED 07/02/2023     Lab Results   Component Value Date    GLUCOSE 148 (H) 05/05/2024    CALCIUM 9.4 05/05/2024     (L) 05/05/2024    K 4.1 05/05/2024    CO2 29 05/05/2024    CL 97 (L) 05/05/2024    BUN 21 05/05/2024    CREATININE 1.01 05/05/2024           Principal Problem:    Fall, initial encounter      Assessment and Plan   #Acute on chronic hypoxic respiratory failure  #COPD with acute exacerbation  #Left eighth and ninth rib fracture  #Right lower lung atelectasis collapse mucous plug  #Status post fall     Continue oxygen supplement per nasal cannula  Titrate down as tolerated  Incentive spirometry  Continue Solu-Medrol DuoNeb nebulization  Continue antibiotics  Mucomyst with nebulization  Chest therapy     #History of CAD  #History of hyperlipidemia and hypertension  Continue home medications     #Daily alcohol use  #Marijuana use  #Current active smoker  Nicotine patch  CIWA protocol  Ativan per CIWA protocol  Thiamine and folate         DVT prophylaxis on Lovenox    5/5/2024  Patient was seen and examined at bedside  Titrate down oxygen as tolerated  Currently on 5 L  Only uses 2 L only as needed at home  Will need home O2 eval prior to discharge  Continue Solu-Medrol DuoNeb and antibiotics  Pulmonary consult   LOS: 1 day

## 2024-05-06 ENCOUNTER — APPOINTMENT (OUTPATIENT)
Dept: RADIOLOGY | Facility: HOSPITAL | Age: 74
DRG: 190 | End: 2024-05-06
Payer: MEDICARE

## 2024-05-06 LAB
ANION GAP SERPL CALC-SCNC: 11 MMOL/L (ref 10–20)
BUN SERPL-MCNC: 28 MG/DL (ref 6–23)
CALCIUM SERPL-MCNC: 9.2 MG/DL (ref 8.6–10.3)
CHLORIDE SERPL-SCNC: 98 MMOL/L (ref 98–107)
CO2 SERPL-SCNC: 30 MMOL/L (ref 21–32)
CREAT SERPL-MCNC: 1.05 MG/DL (ref 0.5–1.3)
EGFRCR SERPLBLD CKD-EPI 2021: 76 ML/MIN/1.73M*2
ERYTHROCYTE [DISTWIDTH] IN BLOOD BY AUTOMATED COUNT: 14.2 % (ref 11.5–14.5)
GLUCOSE SERPL-MCNC: 184 MG/DL (ref 74–99)
HCT VFR BLD AUTO: 43.1 % (ref 41–52)
HGB BLD-MCNC: 14.6 G/DL (ref 13.5–17.5)
HOLD SPECIMEN: NORMAL
MCH RBC QN AUTO: 29.7 PG (ref 26–34)
MCHC RBC AUTO-ENTMCNC: 33.9 G/DL (ref 32–36)
MCV RBC AUTO: 88 FL (ref 80–100)
NRBC BLD-RTO: 0 /100 WBCS (ref 0–0)
PLATELET # BLD AUTO: 425 X10*3/UL (ref 150–450)
POTASSIUM SERPL-SCNC: 4.1 MMOL/L (ref 3.5–5.3)
PROCALCITONIN SERPL-MCNC: 0.03 NG/ML
RBC # BLD AUTO: 4.91 X10*6/UL (ref 4.5–5.9)
SODIUM SERPL-SCNC: 135 MMOL/L (ref 136–145)
WBC # BLD AUTO: 18.2 X10*3/UL (ref 4.4–11.3)

## 2024-05-06 PROCEDURE — 71045 X-RAY EXAM CHEST 1 VIEW: CPT | Performed by: RADIOLOGY

## 2024-05-06 PROCEDURE — 85027 COMPLETE CBC AUTOMATED: CPT | Performed by: STUDENT IN AN ORGANIZED HEALTH CARE EDUCATION/TRAINING PROGRAM

## 2024-05-06 PROCEDURE — 71045 X-RAY EXAM CHEST 1 VIEW: CPT

## 2024-05-06 PROCEDURE — 2500000004 HC RX 250 GENERAL PHARMACY W/ HCPCS (ALT 636 FOR OP/ED): Performed by: STUDENT IN AN ORGANIZED HEALTH CARE EDUCATION/TRAINING PROGRAM

## 2024-05-06 PROCEDURE — 94760 N-INVAS EAR/PLS OXIMETRY 1: CPT

## 2024-05-06 PROCEDURE — 80048 BASIC METABOLIC PNL TOTAL CA: CPT | Performed by: STUDENT IN AN ORGANIZED HEALTH CARE EDUCATION/TRAINING PROGRAM

## 2024-05-06 PROCEDURE — 36415 COLL VENOUS BLD VENIPUNCTURE: CPT | Performed by: STUDENT IN AN ORGANIZED HEALTH CARE EDUCATION/TRAINING PROGRAM

## 2024-05-06 PROCEDURE — 2500000005 HC RX 250 GENERAL PHARMACY W/O HCPCS: Performed by: STUDENT IN AN ORGANIZED HEALTH CARE EDUCATION/TRAINING PROGRAM

## 2024-05-06 PROCEDURE — 2500000002 HC RX 250 W HCPCS SELF ADMINISTERED DRUGS (ALT 637 FOR MEDICARE OP, ALT 636 FOR OP/ED): Performed by: STUDENT IN AN ORGANIZED HEALTH CARE EDUCATION/TRAINING PROGRAM

## 2024-05-06 PROCEDURE — 99222 1ST HOSP IP/OBS MODERATE 55: CPT | Performed by: REGISTERED NURSE

## 2024-05-06 PROCEDURE — S4991 NICOTINE PATCH NONLEGEND: HCPCS | Performed by: STUDENT IN AN ORGANIZED HEALTH CARE EDUCATION/TRAINING PROGRAM

## 2024-05-06 PROCEDURE — 2500000006 HC RX 250 W HCPCS SELF ADMINISTERED DRUGS (ALT 637 FOR ALL PAYERS): Performed by: STUDENT IN AN ORGANIZED HEALTH CARE EDUCATION/TRAINING PROGRAM

## 2024-05-06 PROCEDURE — 1200000002 HC GENERAL ROOM WITH TELEMETRY DAILY

## 2024-05-06 PROCEDURE — 2500000001 HC RX 250 WO HCPCS SELF ADMINISTERED DRUGS (ALT 637 FOR MEDICARE OP): Performed by: STUDENT IN AN ORGANIZED HEALTH CARE EDUCATION/TRAINING PROGRAM

## 2024-05-06 PROCEDURE — 99239 HOSP IP/OBS DSCHRG MGMT >30: CPT | Performed by: STUDENT IN AN ORGANIZED HEALTH CARE EDUCATION/TRAINING PROGRAM

## 2024-05-06 PROCEDURE — 94668 MNPJ CHEST WALL SBSQ: CPT

## 2024-05-06 PROCEDURE — 2500000001 HC RX 250 WO HCPCS SELF ADMINISTERED DRUGS (ALT 637 FOR MEDICARE OP): Performed by: SURGERY

## 2024-05-06 PROCEDURE — 94640 AIRWAY INHALATION TREATMENT: CPT

## 2024-05-06 RX ORDER — LANOLIN ALCOHOL/MO/W.PET/CERES
100 CREAM (GRAM) TOPICAL DAILY
Qty: 30 TABLET | Refills: 0 | Status: SHIPPED | OUTPATIENT
Start: 2024-05-07 | End: 2024-06-06

## 2024-05-06 RX ORDER — PREDNISONE 20 MG/1
40 TABLET ORAL DAILY
Qty: 10 TABLET | Refills: 0 | Status: SHIPPED | OUTPATIENT
Start: 2024-05-06 | End: 2024-05-11

## 2024-05-06 RX ORDER — FOLIC ACID 1 MG/1
1 TABLET ORAL DAILY
Qty: 30 TABLET | Refills: 0 | Status: SHIPPED | OUTPATIENT
Start: 2024-05-07 | End: 2024-06-06

## 2024-05-06 RX ORDER — LEVOFLOXACIN 750 MG/1
750 TABLET ORAL DAILY
Qty: 5 TABLET | Refills: 0 | Status: SHIPPED | OUTPATIENT
Start: 2024-05-06 | End: 2024-05-11

## 2024-05-06 RX ORDER — HYDROCHLOROTHIAZIDE 25 MG/1
25 TABLET ORAL DAILY
COMMUNITY

## 2024-05-06 RX ORDER — PHENOBARBITAL 32.4 MG/1
32.4 TABLET ORAL 3 TIMES DAILY
Status: DISCONTINUED | OUTPATIENT
Start: 2024-05-08 | End: 2024-05-07 | Stop reason: HOSPADM

## 2024-05-06 RX ORDER — IPRATROPIUM BROMIDE AND ALBUTEROL SULFATE 2.5; .5 MG/3ML; MG/3ML
3 SOLUTION RESPIRATORY (INHALATION)
Status: DISCONTINUED | OUTPATIENT
Start: 2024-05-06 | End: 2024-05-07 | Stop reason: HOSPADM

## 2024-05-06 RX ORDER — LANOLIN ALCOHOL/MO/W.PET/CERES
100 CREAM (GRAM) TOPICAL DAILY
Status: DISCONTINUED | OUTPATIENT
Start: 2024-05-09 | End: 2024-05-06

## 2024-05-06 RX ORDER — THIAMINE HYDROCHLORIDE 100 MG/ML
100 INJECTION, SOLUTION INTRAMUSCULAR; INTRAVENOUS DAILY
Status: DISCONTINUED | OUTPATIENT
Start: 2024-05-06 | End: 2024-05-06

## 2024-05-06 RX ORDER — MULTIVIT-MIN/IRON FUM/FOLIC AC 7.5 MG-4
1 TABLET ORAL DAILY
Status: DISCONTINUED | OUTPATIENT
Start: 2024-05-06 | End: 2024-05-06

## 2024-05-06 RX ORDER — IPRATROPIUM BROMIDE AND ALBUTEROL SULFATE 2.5; .5 MG/3ML; MG/3ML
3 SOLUTION RESPIRATORY (INHALATION) EVERY 2 HOUR PRN
Status: DISCONTINUED | OUTPATIENT
Start: 2024-05-06 | End: 2024-05-07 | Stop reason: HOSPADM

## 2024-05-06 RX ORDER — FOLIC ACID 1 MG/1
1 TABLET ORAL DAILY
Status: DISCONTINUED | OUTPATIENT
Start: 2024-05-06 | End: 2024-05-06

## 2024-05-06 RX ORDER — ACETAMINOPHEN 325 MG/1
650 TABLET ORAL EVERY 6 HOURS PRN
COMMUNITY

## 2024-05-06 RX ORDER — PHENOBARBITAL 32.4 MG/1
64.8 TABLET ORAL 3 TIMES DAILY
Status: DISCONTINUED | OUTPATIENT
Start: 2024-05-06 | End: 2024-05-07 | Stop reason: HOSPADM

## 2024-05-06 RX ORDER — LIDOCAINE 560 MG/1
1 PATCH PERCUTANEOUS; TOPICAL; TRANSDERMAL DAILY
Qty: 30 PATCH | Refills: 2 | Status: SHIPPED | OUTPATIENT
Start: 2024-05-07 | End: 2024-06-06

## 2024-05-06 RX ADMIN — FAMOTIDINE 20 MG: 20 TABLET, FILM COATED ORAL at 08:39

## 2024-05-06 RX ADMIN — ENOXAPARIN SODIUM 40 MG: 40 INJECTION SUBCUTANEOUS at 15:33

## 2024-05-06 RX ADMIN — ACETYLCYSTEINE 600 MG: 200 SOLUTION ORAL; RESPIRATORY (INHALATION) at 10:56

## 2024-05-06 RX ADMIN — IPRATROPIUM BROMIDE AND ALBUTEROL SULFATE 3 ML: 2.5; .5 SOLUTION RESPIRATORY (INHALATION) at 15:34

## 2024-05-06 RX ADMIN — ATORVASTATIN CALCIUM 40 MG: 20 TABLET, FILM COATED ORAL at 21:25

## 2024-05-06 RX ADMIN — MAGNESIUM OXIDE 400 MG (241.3 MG MAGNESIUM) TABLET 400 MG: TABLET at 18:17

## 2024-05-06 RX ADMIN — Medication 5 L/MIN: at 08:00

## 2024-05-06 RX ADMIN — LIDOCAINE 4% 1 PATCH: 40 PATCH TOPICAL at 08:31

## 2024-05-06 RX ADMIN — METHYLPREDNISOLONE SODIUM SUCCINATE 40 MG: 40 INJECTION, POWDER, FOR SOLUTION INTRAMUSCULAR; INTRAVENOUS at 15:33

## 2024-05-06 RX ADMIN — PHENOBARBITAL 64.8 MG: 32.4 TABLET ORAL at 18:17

## 2024-05-06 RX ADMIN — OXYCODONE HYDROCHLORIDE 5 MG: 5 TABLET ORAL at 08:31

## 2024-05-06 RX ADMIN — FAMOTIDINE 20 MG: 20 TABLET, FILM COATED ORAL at 21:25

## 2024-05-06 RX ADMIN — TIOTROPIUM BROMIDE INHALATION SPRAY 2 PUFF: 3.12 SPRAY, METERED RESPIRATORY (INHALATION) at 08:34

## 2024-05-06 RX ADMIN — Medication 6 L/MIN: at 20:07

## 2024-05-06 RX ADMIN — IPRATROPIUM BROMIDE AND ALBUTEROL SULFATE 3 ML: 2.5; .5 SOLUTION RESPIRATORY (INHALATION) at 10:56

## 2024-05-06 RX ADMIN — ACETYLCYSTEINE 600 MG: 200 SOLUTION ORAL; RESPIRATORY (INHALATION) at 15:34

## 2024-05-06 RX ADMIN — Medication 100 MG: at 08:30

## 2024-05-06 RX ADMIN — LEVOFLOXACIN 750 MG: 5 INJECTION, SOLUTION INTRAVENOUS at 15:29

## 2024-05-06 RX ADMIN — VALSARTAN 80 MG: 80 TABLET, FILM COATED ORAL at 08:30

## 2024-05-06 RX ADMIN — IPRATROPIUM BROMIDE AND ALBUTEROL SULFATE 3 ML: 2.5; .5 SOLUTION RESPIRATORY (INHALATION) at 07:12

## 2024-05-06 RX ADMIN — ACETYLCYSTEINE 600 MG: 200 SOLUTION ORAL; RESPIRATORY (INHALATION) at 07:12

## 2024-05-06 RX ADMIN — IPRATROPIUM BROMIDE AND ALBUTEROL SULFATE 3 ML: 2.5; .5 SOLUTION RESPIRATORY (INHALATION) at 20:07

## 2024-05-06 RX ADMIN — POLYETHYLENE GLYCOL 3350 17 G: 17 POWDER, FOR SOLUTION ORAL at 08:49

## 2024-05-06 RX ADMIN — FOLIC ACID 1 MG: 1 TABLET ORAL at 08:30

## 2024-05-06 RX ADMIN — METHYLPREDNISOLONE SODIUM SUCCINATE 40 MG: 40 INJECTION, POWDER, FOR SOLUTION INTRAMUSCULAR; INTRAVENOUS at 03:02

## 2024-05-06 RX ADMIN — NICOTINE 1 PATCH: 14 PATCH, EXTENDED RELEASE TRANSDERMAL at 08:30

## 2024-05-06 RX ADMIN — AMLODIPINE BESYLATE 10 MG: 5 TABLET ORAL at 08:30

## 2024-05-06 RX ADMIN — Medication 1 TABLET: at 08:30

## 2024-05-06 RX ADMIN — ACETYLCYSTEINE 600 MG: 200 SOLUTION ORAL; RESPIRATORY (INHALATION) at 20:07

## 2024-05-06 ASSESSMENT — LIFESTYLE VARIABLES
AUDITORY DISTURBANCES: NOT PRESENT
VISUAL DISTURBANCES: NOT PRESENT
VISUAL DISTURBANCES: NOT PRESENT
AGITATION: NORMAL ACTIVITY
NAUSEA AND VOMITING: NO NAUSEA AND NO VOMITING
AUDITORY DISTURBANCES: NOT PRESENT
TOTAL SCORE: 0
ANXIETY: NO ANXIETY, AT EASE
NAUSEA AND VOMITING: NO NAUSEA AND NO VOMITING
TOTAL SCORE: 0
AGITATION: NORMAL ACTIVITY
HEADACHE, FULLNESS IN HEAD: NOT PRESENT
TREMOR: NO TREMOR
HEADACHE, FULLNESS IN HEAD: NOT PRESENT
PAROXYSMAL SWEATS: NO SWEAT VISIBLE
ORIENTATION AND CLOUDING OF SENSORIUM: ORIENTED AND CAN DO SERIAL ADDITIONS
PAROXYSMAL SWEATS: NO SWEAT VISIBLE
TREMOR: NO TREMOR
ANXIETY: NO ANXIETY, AT EASE
ORIENTATION AND CLOUDING OF SENSORIUM: ORIENTED AND CAN DO SERIAL ADDITIONS

## 2024-05-06 ASSESSMENT — PAIN DESCRIPTION - ORIENTATION: ORIENTATION: LEFT

## 2024-05-06 ASSESSMENT — COGNITIVE AND FUNCTIONAL STATUS - GENERAL
MOBILITY SCORE: 24
DAILY ACTIVITIY SCORE: 24

## 2024-05-06 ASSESSMENT — PAIN - FUNCTIONAL ASSESSMENT: PAIN_FUNCTIONAL_ASSESSMENT: 0-10

## 2024-05-06 ASSESSMENT — PAIN SCALES - PAIN ASSESSMENT IN ADVANCED DEMENTIA (PAINAD): BREATHING: NORMAL

## 2024-05-06 ASSESSMENT — ENCOUNTER SYMPTOMS
SHORTNESS OF BREATH: 1
EYES NEGATIVE: 1
PSYCHIATRIC NEGATIVE: 1
MUSCULOSKELETAL NEGATIVE: 1
NEUROLOGICAL NEGATIVE: 1
GASTROINTESTINAL NEGATIVE: 1
CONSTITUTIONAL NEGATIVE: 1

## 2024-05-06 ASSESSMENT — PAIN SCALES - GENERAL: PAINLEVEL_OUTOF10: 8

## 2024-05-06 ASSESSMENT — PAIN DESCRIPTION - LOCATION: LOCATION: CHEST

## 2024-05-06 ASSESSMENT — PAIN SCALES - WONG BAKER: WONGBAKER_NUMERICALRESPONSE: HURTS LITTLE BIT

## 2024-05-06 NOTE — PROGRESS NOTES
05/06/24 1613   Discharge Planning   Living Arrangements Alone   Support Systems Family members   Type of Residence Private residence   Number of Stairs to Enter Residence 0   Do you have animals or pets at home? No   Who is requesting discharge planning? Provider   Home or Post Acute Services None   Patient expects to be discharged to: home   Does the patient need discharge transport arranged? No   Patient Choice   Provider Choice list and CMS website (https://medicare.gov/care-compare#search) for post-acute Quality and Resource Measure Data were provided and reviewed with: Patient   Patient / Family choosing to utilize agency / facility established prior to hospitalization No     Patient states he lives alone, is on oxygen 3.0 liters at home. Patient plan is to return home, ,may require higher level of oxygen, will wait for respiratory evaluation. [facility]. Will continue to follow for further discharge needs, as of now plan will be home no needs.

## 2024-05-06 NOTE — CONSULTS
Inpatient consult to Trauma Surgery  Consult performed by: AKIRA Sousa-CNP  Consult ordered by: Mikaela Conner MD  Reason for consult: fall left sided rib fractures      General Surgery Consult Note  Patient: Elton Lilly  Unit/Bed: [facility]2/1022-A  YOB: 1950  MRN: [id number]  Acct: [id number]   Admitting Diagnosis: Hypoxemia [R09.02]  Fall, initial encounter [W19.XXXA]  Closed fracture of multiple ribs of left side, initial encounter [S22.42XA]  Date:  5/4/2024  Hospital Day: 2  Attending: Mikaela Conner MD    Complaint:  Chief Complaint   Patient presents with    Rib Injury     Pt fell Thursday night and is having L sided rib pain and sob, denies hitting his head, no blood thinners       History of Present Illness:  Elton Lilly is a 73 y.o. male with Past medical history of hypertension, COPD on 2 L oxygen at home, CAD, hyperlipidemia who presented to the ED with worsening shortness of breath .  Patient had a fall Thursday night mechanical fall landed on the left side and was having left-sided rib pain.  Patient has COPD uses 2 L of oxygen at home and was having difficulty getting his oxygen above 90 and was saturating in the 70s at home so decided to come to the ED.  Also reported left-sided rib pain and shortness of breath.  No fever or chills.  Patient reported that he was drinking prior to the fall on Thursday and was smoking weed.  Still smokes cigarettes.  Reports he daily drinks couple of beers and few shots but denies having mild withdrawal wall in the past.  His last drink was 2 days ago.     Upon presentation to the ED vital signs include blood pressure 139/88, saturation was in the 80s up to 85 and was placed on nasal cannula oxygen currently on 4 L saturating 90%, labs tested negative for influenza COVID and RSV troponin 2 times negative CBC leukocytosis 17.5 otherwise unremarkable on CMP sodium 135 otherwise unremarkable hypercalcemia 10.7 magnesium 1.8 lactate was normal   on imaging chest x-ray showed new elevation of right hemidiaphragm with atelectasis of right lower lobe with suspected CT head was negative for acute finding CT cervical thoracic and lumbar spine was done showed multifocal cervical spondylosis with no acute fracture and cervical CT thoracic and lumbar showed possible new acute superior endplate fracture at T12 serology compression fractures without overt acute fracture CT chest abdomen pelvis was done and showed fracture of the left side of the rib eighth and ninth mucous plugging in the right bronchus extending to middle and lower lobe bronchi with atelectasis of right middle lobe and complete atelectasis of the basal segments of the right lower lobe  Patient was admitted for acute hypoxic respiratory failure COPD    Allergies:  Allergies   Allergen Reactions    Bee Venom Protein (Honey Bee) Swelling     Bee Stings      PMHx:  Past Medical History:   Diagnosis Date    COPD (chronic obstructive pulmonary disease) (Multi)     Hypertension      PSHx:  History reviewed. No pertinent surgical history.  Social Hx:  Social History     Socioeconomic History    Marital status: Single     Spouse name: None    Number of children: None    Years of education: None    Highest education level: None   Occupational History    None   Tobacco Use    Smoking status: Every Day     Current packs/day: 1.00     Average packs/day: 1 pack/day for 55.0 years (55.0 ttl pk-yrs)     Types: Cigarettes    Smokeless tobacco: Never   Vaping Use    Vaping status: Never Used   Substance and Sexual Activity    Alcohol use: Defer     Alcohol/week: 5.0 standard drinks of alcohol     Types: 5 Cans of beer per week    Drug use: Yes     Types: Marijuana    Sexual activity: Defer   Other Topics Concern    None   Social History Narrative    None     Social Determinants of Health     Financial Resource Strain: Low Risk  (11/15/2023)    Overall Financial Resource Strain (CARDIA)     Difficulty of  Paying Living Expenses: Not very hard   Food Insecurity: Not on file   Transportation Needs: No Transportation Needs (11/15/2023)    PRAPARE - Transportation     Lack of Transportation (Medical): No     Lack of Transportation (Non-Medical): No   Physical Activity: Not on file   Stress: Not on file   Social Connections: Not on file   Intimate Partner Violence: Not on file   Housing Stability: Low Risk  (11/15/2023)    Housing Stability Vital Sign     Unable to Pay for Housing in the Last Year: No     Number of Places Lived in the Last Year: 1     Unstable Housing in the Last Year: No     Family Hx:  No family history on file.  Review of Systems:   Review of Systems   Constitutional: Negative.    HENT: Negative.     Eyes: Negative.    Respiratory:  Positive for shortness of breath.    Cardiovascular:  Positive for chest pain (left chest wall).   Gastrointestinal: Negative.    Genitourinary: Negative.    Musculoskeletal: Negative.    Skin: Negative.    Neurological: Negative.    Psychiatric/Behavioral: Negative.       Physical Examination:    Visit Vitals  /68   Pulse 78   Temp 36.5 °C (97.7 °F)   Resp 18      Physical Exam  Vitals reviewed.   Constitutional:       General: He is not in acute distress.  HENT:      Head: Normocephalic.      Nose: Nose normal.      Mouth/Throat:      Mouth: Mucous membranes are moist.   Cardiovascular:      Rate and Rhythm: Normal rate.   Pulmonary:      Effort: Pulmonary effort is normal.      Breath sounds: Examination of the left-lower field reveals decreased breath sounds. Decreased breath sounds present.   Abdominal:      General: Abdomen is flat. Bowel sounds are normal.      Palpations: Abdomen is soft.   Musculoskeletal:         General: Normal range of motion.      Cervical back: Normal range of motion.   Skin:     General: Skin is warm.      Capillary Refill: Capillary refill takes less than 2 seconds.   Neurological:      General: No focal deficit present.      Mental  "Status: He is alert and oriented to person, place, and time.      Cranial Nerves: No cranial nerve deficit.      Sensory: No sensory deficit.   Psychiatric:         Mood and Affect: Mood normal.       LABS:  CBC:   Lab Results   Component Value Date    WBC 18.2 (H) 05/06/2024    RBC 4.91 05/06/2024    HGB 14.6 05/06/2024    HCT 43.1 05/06/2024    MCV 88 05/06/2024    MCH 29.7 05/06/2024    MCHC 33.9 05/06/2024    RDW 14.2 05/06/2024     05/06/2024     CBC with Differential:    Lab Results   Component Value Date    WBC 18.2 (H) 05/06/2024    RBC 4.91 05/06/2024    HGB 14.6 05/06/2024    HCT 43.1 05/06/2024     05/06/2024    MCV 88 05/06/2024    MCH 29.7 05/06/2024    MCHC 33.9 05/06/2024    RDW 14.2 05/06/2024    NRBC 0.0 05/06/2024    LYMPHOPCT 3.0 05/04/2024    MONOPCT 7.9 05/04/2024    EOSPCT 0.2 05/04/2024    BASOPCT 0.3 05/04/2024    MONOSABS 1.39 (H) 05/04/2024    LYMPHSABS 0.53 (L) 05/04/2024    EOSABS 0.03 05/04/2024    BASOSABS 0.06 05/04/2024     CMP:    Lab Results   Component Value Date     (L) 05/06/2024    K 4.1 05/06/2024    CL 98 05/06/2024    CO2 30 05/06/2024    BUN 28 (H) 05/06/2024    CREATININE 1.05 05/06/2024    GLUCOSE 184 (H) 05/06/2024    PROT 7.7 05/04/2024    CALCIUM 9.2 05/06/2024    BILITOT 0.8 05/04/2024    ALKPHOS 152 (H) 05/04/2024    AST 16 05/04/2024    ALT 16 05/04/2024     BMP:    Lab Results   Component Value Date     (L) 05/06/2024    K 4.1 05/06/2024    CL 98 05/06/2024    CO2 30 05/06/2024    BUN 28 (H) 05/06/2024    CREATININE 1.05 05/06/2024    CALCIUM 9.2 05/06/2024    GLUCOSE 184 (H) 05/06/2024     Magnesium:  Lab Results   Component Value Date    MG 1.80 05/04/2024     Troponin:    Lab Results   Component Value Date    TROPHS 12 05/04/2024    TROPHS 11 05/04/2024     Lipid Panel:  No results found for: \"HDL\", \"CHHDL\", \"VLDL\", \"TRIG\", \"NHDL\"   Current Medications:    Current Facility-Administered Medications:     acetaminophen (Tylenol) tablet 650 " mg, 650 mg, oral, q4h PRN **OR** acetaminophen (Tylenol) oral liquid 650 mg, 650 mg, nasogastric tube, q4h PRN **OR** acetaminophen (Tylenol) suppository 650 mg, 650 mg, rectal, q4h PRN, Mikaela Conner MD    acetaminophen (Tylenol) tablet 650 mg, 650 mg, oral, q4h PRN **OR** acetaminophen (Tylenol) oral liquid 650 mg, 650 mg, oral, q4h PRN **OR** acetaminophen (Tylenol) suppository 650 mg, 650 mg, rectal, q4h PRN, Mikaela Conner MD    acetylcysteine (Mucomyst) 200 mg/mL (20 %) nebulizer solution 600 mg, 3 mL, nebulization, 4x daily, Mikaela Conner MD, 600 mg at 05/06/24 1056    amLODIPine (Norvasc) tablet 10 mg, 10 mg, oral, Daily, Mikaela Conner MD, 10 mg at 05/06/24 0830    atorvastatin (Lipitor) tablet 40 mg, 40 mg, oral, Nightly, Mikaela Conner MD, 40 mg at 05/05/24 2007    enoxaparin (Lovenox) syringe 40 mg, 40 mg, subcutaneous, q24h, Mikaela Conner MD, 40 mg at 05/05/24 1532    famotidine (Pepcid) tablet 20 mg, 20 mg, oral, BID, 20 mg at 05/06/24 0839 **OR** famotidine PF (Pepcid) injection 20 mg, 20 mg, intravenous, BID, Mikaela Conner MD    folic acid (Folvite) tablet 1 mg, 1 mg, oral, Daily, Mikaela Conner MD, 1 mg at 05/06/24 0830    ipratropium-albuteroL (Duo-Neb) 0.5-2.5 mg/3 mL nebulizer solution 3 mL, 3 mL, nebulization, 4x daily, Mikaela Conner MD, 3 mL at 05/06/24 1056    ipratropium-albuteroL (Duo-Neb) 0.5-2.5 mg/3 mL nebulizer solution 3 mL, 3 mL, nebulization, q2h PRN, Mikaela Conner MD    levoFLOXacin (Levaquin)  mg, 750 mg, intravenous, q24h, Mikaela Conner MD, Stopped at 05/05/24 1702    lidocaine 4 % patch 1 patch, 1 patch, transdermal, Daily, Mikaela Conner MD, 1 patch at 05/06/24 0831    LORazepam (Ativan) tablet 0.5 mg, 0.5 mg, oral, q2h PRN **OR** LORazepam (Ativan) tablet 1 mg, 1 mg, oral, q2h PRN **OR** LORazepam (Ativan) tablet 2 mg, 2 mg, oral, q2h PRN, Mikaela Conner MD    magnesium oxide (Mag-Ox) tablet 400 mg, 400 mg, oral,  q24h, Mikaela Conner MD, 400 mg at 05/05/24 1532    melatonin tablet 3 mg, 3 mg, oral, Nightly PRN, Mikaela Conner MD    methylPREDNISolone sod succinate (SOLU-Medrol) 40 mg/mL injection 40 mg, 40 mg, intravenous, q12h, Mikaela Conner MD, 40 mg at 05/06/24 0302    multivitamin with minerals 1 tablet, 1 tablet, oral, Daily, Mikaela Conner MD, 1 tablet at 05/06/24 0830    nicotine (Nicoderm CQ) 14 mg/24 hr patch 1 patch, 1 patch, transdermal, Daily, Mikaela Conner MD, 1 patch at 05/06/24 0830    ondansetron (Zofran) tablet 4 mg, 4 mg, oral, q8h PRN **OR** ondansetron (Zofran) injection 4 mg, 4 mg, intravenous, q8h PRN, Mikaela Conner MD    oxyCODONE (Roxicodone) immediate release tablet 5 mg, 5 mg, oral, q6h PRN, Mikaela Conner MD, 5 mg at 05/06/24 0831    oxygen (O2) therapy, , inhalation, Continuous, Jason Ramesh PA-C, Last Rate: 300,000 mL/hr at 05/04/24 0902, 5 L/min at 05/04/24 0902    oxygen (O2) therapy, , inhalation, Continuous - Inhalation, Mikaela Conner MD, 6 L/min at 05/06/24 1422    polyethylene glycol (Glycolax, Miralax) packet 17 g, 17 g, oral, Daily PRN, Mikaela Conner MD, 17 g at 05/06/24 0849    thiamine (Vitamin B-1) tablet 100 mg, 100 mg, oral, Daily, Mikaela Conner MD, 100 mg at 05/06/24 0830    tiotropium (Spiriva Respimat) 2.5 mcg/actuation inhaler 2 puff, 2 puff, inhalation, Daily, Mikaela Conner MD, 2 puff at 05/06/24 0834    valsartan (Diovan) tablet 80 mg, 80 mg, oral, Daily, Mikaela Conner MD, 80 mg at 05/06/24 0830  ECG 12 lead    Result Date: 5/4/2024  Sinus rhythm with occasional Premature ventricular complexes Otherwise normal ECG When compared with ECG of 12-MAR-2024 11:44, Premature ventricular complexes are now Present QT has shortened See ED provider note for full interpretation and clinical correlation Confirmed by Odette Reardon ([id number]) on 5/4/2024 3:02:22 PM    CT chest abdomen pelvis w IV contrast    Result Date:  5/4/2024  Interpreted By:  Schoenberger, Joseph, STUDY: CT CHEST ABDOMEN PELVIS W IV CONTRAST;  5/4/2024 9:51 am   INDICATION: Signs/Symptoms:Fall, left lateral posterior rib pain   COMPARISON: 11/25/2022   ACCESSION NUMBER(S): [id number]   ORDERING CLINICIAN: ANABELLA KAPLAN   TECHNIQUE: CT of the chest, abdomen, and pelvis was performed. Contiguous axial images were obtained at  5 mm slice thickness through the chest, and at  3 mm through the abdomen and pelvis. Coronal and sagittal reconstructions at  3 mm slice thickness were performed. 75 cc of Omnipaque 350 were injected for the exam   FINDINGS: CHEST:   LUNG/PLEURA/LARGE AIRWAYS: There is extensive mucous plugging in the right bronchus intermedius extending into the right middle and lower lobe bronchi. There is complete atelectasis of the basal segments right lower lobe and partial atelectasis of the right middle lobe. In the aerated right upper lobe there are several very small cavitary lesions. Correlate for atypical infectious etiology. Distribution atypical for septic emboli. No similar findings are noted on the left. Recommend chest CT follow-up after treatment. No significant focal left lung opacity is evident. There is no pneumothorax. There is a minimal amount of dependent layering right pleural fluid   VESSELS: No traumatic aortic injury is appreciated within the limitations of this non-EKG gated study.  The thoracic aorta is of normal course and caliber.  Main pulmonary artery and its branches are normal in caliber.  There are mild coronary atherosclerotic calcifications.   HEART: The heart is normal in size.   There is no pericardial effusion.   MEDIASTINUM AND KORI: No pneumomediastinum, abnormal mediastinal fluid collection or mediastinal hematoma are appreciated.  No mediastinal, hilar or biaxillary adenopathy is present.  The esophagus is normal in course and caliber.   CHEST WALL AND LOWER NECK: There is an undisplaced fracture of the  posterolateral left 9th rib. There is a minimally displaced fracture of the posterior left 8th rib. No other thoracic fractures are identified other than the previously described minimal T12 superior endplate vertebral body fracture. No aggressive osteolytic or osteoblastic lesion. Soft tissues of the chest wall appear intact with the exception of some minimal infiltration of the soft tissues abutting the rib fractures without large hematoma.   ABDOMEN:   LIVER: No focal perfusion abnormality of the liver is appreciated to suggest contusion or laceration. There is no subcapsular hematoma, no perihepatic fluid collection.   GALLBLADDER: The gallbladder is nondistended without evidence of radiopaque stone.   BILE DUCTS: The intahepatic and extrahepatic bile ducts are not dilated.   PANCREAS: The pancreas appears unremarkable.   SPLEEN: No parenchymal perfusion deficit of the spleen is appreciated to suggest contusion or laceration. There is no subcapsular hematoma, no perisplenic fluid collection.   ADRENAL GLANDS: The bilateral adrenal glands are unremarkable in appearance.   KIDNEYS AND URETERS: No parenchymal perfusion deficit is appreciated in bilateral kidneys to suggest contusion or laceration. There is no subcapsular hematoma, no perinephric fluid collection.  No hydroureteral nephrosis or ureter stone is noted. There are several left renal cysts and 1 left renal angiomyolipoma that are unchanged from the previous exam. Small right renal cysts are also unchanged.   PELVIS:   BLADDER: Mild circumferential wall thickening of the urinary bladder unchanged.   REPRODUCTIVE ORGANS: The prostate is enlarged.   BOWEL: The stomach is unremarkable.  The small bowel is normal in caliber without evidence of focal wall thickening or obstruction.  There is no evidence of focal wall thickening or dilatation of the large bowel.   VESSELS: There are atherosclerotic calcifications of the abdominal aorta with no adjacent hematoma  or no aneurysmal dilation. The principal vasculature of the abdomen and pelvis is patent.   PERITONEUM/RETROPERITONEUM/LYMPH NODES: There is no evidence of intra- or retroperitoneal hematoma.  There is no free or loculated fluid collection, no free intraperitoneal air. No abdominopelvic lymphadenopathy is present.   BONES AND ABDOMINAL WALL: No evidence of acute fracture or dislocation of the included osseous structures.  No suspicious osseous lesions are identified.  There is a fat containing left inguinal hernia. Abdominal wall structures appear otherwise intact.       CHEST 1.  There are acute fractures of the left-sided 8th and 9th ribs. No overt associated complications. 2. There is mucous plugging in the right bronchus intermedius extending into the middle and lower lobe bronchi. There is associated partial atelectasis of the right middle lobe and complete atelectasis of the basal segments of the right lower lobe. 3. The minimal superior endplate fracture at T12 is again noted. 4. There is only trace left pleural effusion. There is no pneumothorax.   ABDOMEN - PELVIS 1.  No definite acute abdomen or pelvis finding.     MACRO: None   Signed by: Joseph Schoenberger 5/4/2024 10:36 AM Dictation workstation:   [id number]    CT lumbar spine wo IV contrast    Result Date: 5/4/2024  Interpreted By:  Schoenberger, Joseph, STUDY: CT LUMBAR SPINE WO IV CONTRAST  5/4/2024 9:50 am   INDICATION: Signs/Symptoms:Fall   COMPARISON: None.   ACCESSION NUMBER(S): [id number]   ORDERING CLINICIAN: ANABELLA KAPLAN   TECHNIQUE: Axial CT images of the lumbar spine are obtained. Axial, coronal and sagittal reconstructions are provided for review.   FINDINGS: Alignment:  Within normal limits.   Vertebrae/Disc Spaces:   The mild superior endplate fracture at the T12 level is again noted. The other lumbar vertebra are maintained in height. There is severe degenerative narrowing of the L4 and L5 discs. More mild narrowing at other lumbar  levels.   Lower Thoracic Spine:  There is no significant central canal stenosis in the included lower thoracic region.   T12-L1:  Mild degenerative narrowing of the disc without canal or foraminal stenosis   L1-2:  Mild degenerative narrowing of the disc without canal or foraminal stenosis   L2-3:  Mild degenerative narrowing of the disc without canal or foraminal stenosis.   L3-4:  Loss foraminal height and some disc osteophyte complex results in mild stenosis of the right neural foramen.   L4-5:  Mild bilateral foraminal stenosis due to loss foraminal height.   L5-S1:  Loss foraminal height and disc osteophyte complex results in relatively severe stenosis in the left neural foramina mild stenosis in the right neural foramen.   Prevertebral/Paraspinal Soft Tissues: The prevertebral and paraspinal soft tissues are unremarkable.       The mild superior endplate fracture at the T12 level is again noted. No other fracture or no acute subluxation or compromise the spinal canal on this exam.   MACRO: None   Signed by: Joseph Schoenberger 5/4/2024 10:24 AM Dictation workstation:   [id number]    CT thoracic spine wo IV contrast    Result Date: 5/4/2024  Interpreted By:  Schoenberger, Joseph, STUDY: CT THORACIC SPINE WO IV CONTRAST;  5/4/2024 9:50 am   INDICATION: Signs/Symptoms:Fall.   COMPARISON: 11/25/2022   ACCESSION NUMBER(S): [id number]   ORDERING CLINICIAN: ANABELLA KAPLAN   TECHNIQUE: Axial CT images of the thoracic spine are obtained. Axial, coronal and sagittal reconstructions are submitted for review.   FINDINGS:     Alignment: There is a significant dextroconvex mid and lower thoracic scoliotic curve with compensatory upper thoracic levoconvex curve. The sagittal alignment of the vertebra is maintained.   Vertebrae/Intervertebral Discs: There is new diffuse volume loss is 6 of the T5 vertebral body. This is rather mild. Upon close analysis of thin section axial images no convincing evidence for acute fracture  lines noted.   There is new mild superior endplate depression at the T9 level. This appears to be associated with Schmorl's node formation without evidence for acute fracture lines.   There is new superior endplate depression at T10. Again no acute fracture lines are convincingly identified on the thin-section images. All   There is a fracture line which may be acute or subacute paralleling the superior endplate of T12. There is mild volume loss of the T12 vertebral body. There is no involvement of posterior elements. It extends to the right anterior corner. It is not apparent previously. There is relatively mild multilevel degenerative disc narrowing with several vacuum discs. There is no significant central canal stenosis.   Paraspinous Soft Tissues: Unremarkable   The posterior elements are aligned and intact without fracture or subluxation.       There is a possible new acute mild superior endplate fracture at the T12 level paralleling the anterior endplate extending to the right anterior corner.   There are interval several mild compression fractures without overt acute fracture lines on thin-section axial images and there are discussed in detail above.   No overt compromise the spinal canal on this exam.   MACRO: None   Signed by: Joseph Schoenberger 5/4/2024 10:19 AM Dictation workstation:   [id number]    CT cervical spine wo IV contrast    Result Date: 5/4/2024  Interpreted By:  Schoenberger, Joseph, STUDY: CT CERVICAL SPINE WO IV CONTRAST;  5/4/2024 9:50 am   INDICATION: Signs/Symptoms:Fall.   COMPARISON: None.   ACCESSION NUMBER(S): [id number]   ORDERING CLINICIAN: ANABELLA KAPLAN   TECHNIQUE: Axial CT images of the cervical spine are obtained. Axial, coronal and sagittal reconstructions are provided for review.   FINDINGS:     Fractures: There is no evidence for an acute fracture of the cervical spine.   Vertebral Alignment: There is a reversal of the normal lordotic curve in the cervical spine. The  sagittal alignment is otherwise maintained.   Craniocervical Junction: Intact other than degenerative changes.   Vertebrae/Disc Spaces:  There is severe degenerative narrowing is associated discogenic endplate sclerosis involving the C2, C3, C4, C5, and C6 and C7 disc. Discogenic endplate spurs at these levels. Multilevel foraminal encroachment.   Prevertebral/Paraspinal Soft Tissues: The prevertebral and paraspinal soft tissues are unremarkable.   There is relatively mild multilevel facet arthrosis. Posterior elements are otherwise intact without fracture or subluxation.       Severe multilevel cervical spondylosis. No acute fracture or subluxation.   MACRO: None   Signed by: Joseph Schoenberger 5/4/2024 10:08 AM Dictation workstation:   [id number]    CT head wo IV contrast    Result Date: 5/4/2024  Interpreted By:  Schoenberger, Joseph, STUDY: CT HEAD WO IV CONTRAST;  5/4/2024 9:50 am   INDICATION: Signs/Symptoms:Fall.   COMPARISON: None.   ACCESSION NUMBER(S): [id number]   ORDERING CLINICIAN: ANABELLA KAPLAN   TECHNIQUE: Noncontrast axial CT scan of head was performed. Angled reformats in brain and bone windows were generated. The images were reviewed in bone, brain, blood and soft tissue windows.   FINDINGS: CSF Spaces: Enlarged due to parenchymal volume loss. Normal configuration with a type basal cisterns. There is no extraaxial fluid collection.   Parenchyma:  The grey-white differentiation is intact. There is no mass effect or midline shift.  There is no intracranial hemorrhage.   Calvarium: The calvarium is unremarkable.   Paranasal sinuses and mastoids: Visualized paranasal sinuses and mastoids are clear.       No evidence of acute cortical infarct or intracranial hemorrhage.   No evidence of intracranial hemorrhage or displaced skull fracture.   MACRO: None   Signed by: Joseph Schoenberger 5/4/2024 10:05 AM Dictation workstation:   [id number]    XR chest 1 view    Result Date: 5/4/2024  Interpreted By:   Schoenberger, Joseph, STUDY: XR CHEST 1 VIEW;  5/4/2024 8:42 am   INDICATION: Signs/Symptoms:Shortness of breath.   COMPARISON: 03/12/2024   ACCESSION NUMBER(S): [id number]   ORDERING CLINICIAN: ANABELLA KAPLAN   FINDINGS:         CARDIOMEDIASTINAL SILHOUETTE: Cardiomediastinal silhouette is normal in size and configuration.   LUNGS: There is new volume loss in the right hemithorax with some mediastinal shift to the right. There is opacity in the lower right hemithorax. There is likely at least partial atelectasis of the right lower lobe and some elevation of the right hemidiaphragm which is new from the prior.   ABDOMEN: No remarkable upper abdominal findings.   BONES: No acute osseous changes.       1.  New elevation right hemidiaphragm at least partial atelectasis of the right lower lobe with volume loss the right hemithorax. See discussion above       MACRO: None   Signed by: Joseph Schoenberger 5/4/2024 8:46 AM Dictation workstation:   [id number]     Assessment:    S/P fall    Patient is a 73 yr old male that called EMS on 5/4 as he could not get his o2 saturation higher that 80%. He states that he fell at home and was able to crawl back into bed which happened on 5/3. He states he did have chest pain but was able to manage. What concerned him was the pulse ox. and increased SOB.     On exam there is left chest wall tenderness. Breath sounds are diminished in the left lower lung base. No bruising to chest noted. IS 1000. 4L NC. CT chest abdomen and pelvis showed undisplaced fracture of the posterolateral left 9th rib. There is a minimally displaced fracture of the posterior left 8th rib.  CT thoracic spine showed possible new acute mild superior endplate fracture at the T12 level paralleling the anterior endplate extending to the right anterior corner.    Plan:  -Continue diet as tolerated  -Consulted ortho d/t possible new endplate fracture T12  -Consulted social work d/t ETOH history.   -Pain  control  -Nausea control  -DVT prophylaxis-sub q lovnox  -Pulmonary toileting   -Encourage ambulation  -Continue care per primary team     Further recommendations per Dr. You     Time spent  60  minutes obtaining labs, imaging, recommendations, interview, assessment, examination, medication review/ordering, and EMR review.    Plan of care was discussed extensively with patient. Patient verbalized understanding through teach back method. All questions and concerns addressed upon examination.     Of note, this documentation is completed using the Dragon Dictation system (voice recognition software). There may be spelling and/or grammatical errors that were not corrected prior to final submission.    Electronically signed by SABRINA Suosa on 5/6/2024 at 3:03 PM

## 2024-05-06 NOTE — SIGNIFICANT EVENT
Patient was about to be discharged today but was noted to be only saturating 89% upon ambulation on 6 L.  Patient has home O2 but his oxygen at home is 2 L as needed and patient will be kept another night to reevaluation tomorrow and if he is still requiring 6 L will new home O2 to be set up prior to discharge as his current concentrator does not go above 5.  Will keep patient overnight

## 2024-05-06 NOTE — CONSULTS
Reason For Consult  Evaluation of acute on chronic hypoxic respiratory failure, COPD exacerbation, atelectasis and rib fractures   History Of Present Illness  Elton Lilly is a 73 y.o. male with Past medical history of hypertension, COPD on 2 L oxygen at home, CAD, hyperlipidemia who presented to the ED with worsening shortness of breath .  Patient had a fall Thursday night mechanical fall landed on the left side and was having left-sided rib pain.  Patient has COPD uses 2 L of oxygen at home and was having difficulty getting his oxygen above 90 and was saturating in the 70s at home so decided to come to the ED.  Also reported left-sided rib pain and shortness of breath.  No fever or chills.  Patient reported that he was drinking prior to the fall on Thursday and was smoking weed.  Still smokes cigarettes.  Reports he daily drinks couple of beers and few shots but denies having mild withdrawal wall in the past.  His last drink was 2 days ago.     Upon presentation to the ED vital signs include blood pressure 139/88, saturation was in the 80s up to 85 and was placed on nasal cannula oxygen currently on 4 L saturating 90%, labs tested negative for influenza COVID and RSV troponin 2 times negative CBC leukocytosis 17.5 otherwise unremarkable on CMP sodium 135 otherwise unremarkable hypercalcemia 10.7 magnesium 1.8 lactate was normal  on imaging chest x-ray showed new elevation of right hemidiaphragm with atelectasis of right lower lobe with suspected CT head was negative for acute finding CT cervical thoracic and lumbar spine was done showed multifocal cervical spondylosis with no acute fracture and cervical CT thoracic and lumbar showed possible new acute superior endplate fracture at T12 serology compression fractures without overt acute fracture CT chest abdomen pelvis was done and showed fracture of the left side of the rib eighth and ninth mucous plugging in the right bronchus extending to middle and lower  lobe bronchi with atelectasis of right middle lobe and complete atelectasis of the basal segments of the right lower lobe. Patient was admitted for acute hypoxic respiratory failure COPD.  I was asked to evaluate and follow from a pulmonary perspective.  Patient has longstanding smoking still smokes few cigarettes a day.  Is noted to have blood COPD is noted to have blood COPD admits he takes Stiolto Respimat inhaler 2 puffs every morning and albuterol MDI 2 puffs every 4 hours as needed.  He is on home O2 2 L nasal cannula.  He follows with Dr. Roe Menon an Internist with [facility].  Chart reviewed, patient examined CT scan and other imaging was reviewed by me.     Past Medical History  Medical History        Past Medical History:   Diagnosis Date    COPD (chronic obstructive pulmonary disease) (Multi)      Hypertension              Surgical History  Surgical History   History reviewed. No pertinent surgical history.        Social History  He reports that he has been smoking cigarettes. He has a 55 pack-year smoking history. He has never used smokeless tobacco. Alcohol use questions deferred to the physician. He reports current drug use. Drug: Marijuana.     Family History  Family History   No family history on file.        Allergies  Patient has no known allergies.     Review of Systems   Constitutional: Negative.    HENT: Negative.     Eyes: Negative.    Respiratory:  Positive for shortness of breath.    Cardiovascular:  Positive for chest pain.   Gastrointestinal: Negative.    Genitourinary: Negative.    Musculoskeletal: Negative.    Skin: Negative.    Neurological: Negative.    Psychiatric/Behavioral: Negative.           Physical Exam  Constitutional:       General: He is not in acute distress.     Appearance: Normal appearance. He is not ill-appearing, toxic-appearing or diaphoretic.   HENT:      Head: Normocephalic and atraumatic.      Mouth/Throat:      Mouth: Mucous membranes are moist.       "Pharynx: No oropharyngeal exudate.   Eyes:      Extraocular Movements: Extraocular movements intact.      Pupils: Pupils are equal, round, and reactive to light.   Cardiovascular:      Rate and Rhythm: Normal rate and regular rhythm.      Heart sounds: No murmur heard.  Pulmonary:      Effort: Pulmonary effort is normal. No respiratory distress.      Breath sounds: Normal breath sounds. No wheezing.   Abdominal:      General: Abdomen is flat. Bowel sounds are normal. There is no distension.      Tenderness: There is no abdominal tenderness. There is no guarding or rebound.   Musculoskeletal:         General: No swelling.      Right lower leg: No edema.      Left lower leg: No edema.   Skin:     Findings: No lesion or rash.   Neurological:      General: No focal deficit present.      Mental Status: He is alert and oriented to person, place, and time.      Cranial Nerves: No cranial nerve deficit.      Motor: No weakness.   Psychiatric:         Mood and Affect: Mood normal.         Behavior: Behavior normal.            Last Recorded Vitals  Blood pressure 135/88, pulse 82, temperature 36.7 °C (98.1 °F), temperature source Temporal, resp. rate 16, height 1.626 m (5' 4\"), weight 72.6 kg (160 lb), SpO2 (!) 89%.     Relevant Results                 Results for orders placed or performed during the hospital encounter of 05/04/24 (from the past 24 hour(s))   ECG 12 lead   Result Value Ref Range     Ventricular Rate 91 BPM     Atrial Rate 91 BPM     KS Interval 160 ms     QRS Duration 84 ms     QT Interval 326 ms     QTC Calculation(Bazett) 400 ms     P Axis 66 degrees     R Axis 61 degrees     T Axis 78 degrees     QRS Count 15 beats     Q Onset 222 ms     P Onset 142 ms     P Offset 210 ms     T Offset 385 ms     QTC Fredericia 374 ms   Sars-CoV-2 PCR   Result Value Ref Range     Coronavirus 2019, PCR Not Detected Not Detected   Influenza A, and B PCR   Result Value Ref Range     Flu A Result Not Detected Not Detected     " Flu B Result Not Detected Not Detected   RSV PCR   Result Value Ref Range     RSV PCR Not Detected Not Detected   CBC and Auto Differential   Result Value Ref Range     WBC 17.5 (H) 4.4 - 11.3 x10*3/uL     nRBC 0.0 0.0 - 0.0 /100 WBCs     RBC 5.40 4.50 - 5.90 x10*6/uL     Hemoglobin 16.0 13.5 - 17.5 g/dL     Hematocrit 46.9 41.0 - 52.0 %     MCV 87 80 - 100 fL     MCH 29.6 26.0 - 34.0 pg     MCHC 34.1 32.0 - 36.0 g/dL     RDW 14.0 11.5 - 14.5 %     Platelets 431 150 - 450 x10*3/uL     Neutrophils % 88.0 40.0 - 80.0 %     Immature Granulocytes %, Automated 0.6 0.0 - 0.9 %     Lymphocytes % 3.0 13.0 - 44.0 %     Monocytes % 7.9 2.0 - 10.0 %     Eosinophils % 0.2 0.0 - 6.0 %     Basophils % 0.3 0.0 - 2.0 %     Neutrophils Absolute 15.38 (H) 1.60 - 5.50 x10*3/uL     Immature Granulocytes Absolute, Automated 0.10 0.00 - 0.50 x10*3/uL     Lymphocytes Absolute 0.53 (L) 0.80 - 3.00 x10*3/uL     Monocytes Absolute 1.39 (H) 0.05 - 0.80 x10*3/uL     Eosinophils Absolute 0.03 0.00 - 0.40 x10*3/uL     Basophils Absolute 0.06 0.00 - 0.10 x10*3/uL   Comprehensive Metabolic Panel   Result Value Ref Range     Glucose 116 (H) 74 - 99 mg/dL     Sodium 135 (L) 136 - 145 mmol/L     Potassium 3.9 3.5 - 5.3 mmol/L     Chloride 96 (L) 98 - 107 mmol/L     Bicarbonate 31 21 - 32 mmol/L     Anion Gap 12 10 - 20 mmol/L     Urea Nitrogen 13 6 - 23 mg/dL     Creatinine 0.99 0.50 - 1.30 mg/dL     eGFR 81 >60 mL/min/1.73m*2     Calcium 10.7 (H) 8.6 - 10.3 mg/dL     Albumin 4.5 3.4 - 5.0 g/dL     Alkaline Phosphatase 152 (H) 33 - 136 U/L     Total Protein 7.7 6.4 - 8.2 g/dL     AST 16 9 - 39 U/L     Bilirubin, Total 0.8 0.0 - 1.2 mg/dL     ALT 16 10 - 52 U/L   Magnesium   Result Value Ref Range     Magnesium 1.80 1.60 - 2.40 mg/dL   Lactate   Result Value Ref Range     Lactate 1.2 0.4 - 2.0 mmol/L   B-Type Natriuretic Peptide   Result Value Ref Range      (H) 0 - 99 pg/mL   Troponin I, High Sensitivity, Initial   Result Value Ref Range      Troponin I, High Sensitivity 11 0 - 20 ng/L   Light Blue Top   Result Value Ref Range     Extra Tube Hold for add-ons.     Troponin, High Sensitivity, 1 Hour   Result Value Ref Range     Troponin I, High Sensitivity 12 0 - 20 ng/L               Assessment/Plan   Active Problems:  There are no active Hospital Problems.     #Acute on chronic hypoxic respiratory failure  #COPD with acute exacerbation  #Left eighth and ninth rib fracture  #Right lower lung atelectasis collapse mucous plug  #Status post fall     Continue oxygen supplement per nasal cannula  Titrate down as tolerated  Incentive spirometry  Continue Solu-Medrol DuoNeb nebulization  Continue antibiotics  Mucomyst with nebulization  Chest therapy     #History of CAD  #History of hyperlipidemia and hypertension  Continue home medications     #Daily alcohol use  #Marijuana use  #Current active smoker  Nicotine patch  CIWA protocol  Ativan per CIWA protocol  Thiamine and folate       DVT prophylaxis on Lovenox    Pulmonary comments:- Even though patient is hypoxia is worse than baseline, this is likely due to chest restriction from rib fracture as well as significant atelectasis of right lower lobe.  There is no significant COPD exacerbation, pneumonia, CHF or other acute problems to keep patient in the Hospital. Okay to discharge home on increased oxygen.  I will follow-up patient in the office in about a week. ATS pamphlets on healthy sleep, insomnia, COPD, and inhaler use and nicotine dependence were given. Thank you for the referral  Dwight Sawant MD

## 2024-05-06 NOTE — DISCHARGE SUMMARY
Discharge Diagnosis  Fall, initial encounter  1. Fall, initial encounter    2. Hypoxemia    3. Closed fracture of multiple ribs of left side, initial encounter       Issues Requiring Follow-Up  Follow-up with pulmonary  Follow-up with PCP      Discharge Meds     Your medication list        START taking these medications        Instructions Last Dose Given Next Dose Due   folic acid 1 mg tablet  Commonly known as: Folvite  Start taking on: May 7, 2024      Take 1 tablet (1 mg) by mouth once daily.       levoFLOXacin 750 mg tablet  Commonly known as: Levaquin      Take 1 tablet (750 mg) by mouth once daily for 5 days.       lidocaine 4 % patch  Start taking on: May 7, 2024      Place 1 patch over 12 hours on the skin once daily. Remove & discard patch within 12 hours or as directed by MD.       predniSONE 20 mg tablet  Commonly known as: Deltasone      Take 2 tablets (40 mg) by mouth once daily for 5 days.       thiamine 100 mg tablet  Commonly known as: Vitamin B-1  Start taking on: May 7, 2024      Take 1 tablet (100 mg) by mouth once daily.       tiotropium 2.5 mcg/actuation inhaler  Commonly known as: Spiriva Respimat  Start taking on: May 7, 2024  Replaces: tiotropium 18 mcg inhalation capsule      Inhale 2 puffs once daily.              CONTINUE taking these medications        Instructions Last Dose Given Next Dose Due   acetaminophen 325 mg tablet  Commonly known as: Tylenol           albuterol 90 mcg/actuation inhaler           amLODIPine 10 mg tablet  Commonly known as: Norvasc           atorvastatin 40 mg tablet  Commonly known as: Lipitor           hydroCHLOROthiazide 25 mg tablet  Commonly known as: HYDRODiuril           magnesium oxide 420 mg tablet  Commonly known as: Mag-Ox           potassium gluconate 550 mg (90 mg) tablet           STIOLTO RESPIMAT INHL           valsartan 80 mg tablet  Commonly known as: Diovan                  STOP taking these medications      tiotropium 18 mcg inhalation  capsule  Commonly known as: Spiriva  Replaced by: tiotropium 2.5 mcg/actuation inhaler                  Where to Get Your Medications        These medications were sent to [id number] - [place], [place] - [address]  [address] [place] [address] [place] [address]      Phone: [phone]   folic acid 1 mg tablet  levoFLOXacin 750 mg tablet  lidocaine 4 % patch  predniSONE 20 mg tablet  thiamine 100 mg tablet  tiotropium 2.5 mcg/actuation inhaler         Test Results Pending At Discharge  Pending Labs       No current pending labs.            Hospital Course  Elton Lilly is a 73 y.o. male with Past medical history of hypertension, COPD on 2 L oxygen at home as needed, CAD, hyperlipidemia who presented to the ED with worsening shortness of breath .  Patient had a fall Thursday night mechanical fall landed on the left side and was having left-sided rib pain.  Patient has COPD uses 2 L of oxygen at home and was having difficulty getting his oxygen above 90 and was saturating in the 70s at home so decided to come to the ED.  Also reported left-sided rib pain and shortness of breath.  Patient reported that he was drinking prior to the fall on Thursday and was smoking weed.  Still smokes cigarettes.  Reports he daily drinks couple of beers and few shots but denies having mild withdrawal wall in the past.  His last drink was 2 days ago. Upon presentation to the ED saturation was in the 80s up to 85 and was placed on nasal cannula oxygen on 4 L saturating 90%, chest x-ray showed new elevation of right hemidiaphragm with atelectasis of right lower lobe with suspected CT head was negative for acute finding CT cervical thoracic and lumbar spine was done showed multifocal cervical spondylosis with no acute fracture and cervical CT thoracic and lumbar showed possible new acute superior endplate fracture at T12  compression fractures without overt acute fracture CT chest abdomen pelvis was done and showed fracture  of the left side of the rib eighth and ninth mucous plugging in the right bronchus extending to middle and lower lobe bronchi with atelectasis of right middle lobe and complete atelectasis of the basal segments of the right lower lobe Patient was admitted for acute hypoxic respiratory failure COPD patient remained on 4 L of oxygen per nasal cannula, was evaluated by pulmonary, recommended outpatient follow-up, incentive spirometry , Lidocaine patch for pain control.  Reported breathing better today and wanted to be discharged home today.  Patient can be discharged home today with outpatient follow-up with pulmonary and PCP.  Stressed the importance of quitting smoking compliance to treatment.  Total discharge time spent 35 minutes    Pertinent Physical Exam At Time of Discharge  Physical Exam  Constitutional:       General: He is not in acute distress.     Appearance: Normal appearance. He is not ill-appearing, toxic-appearing or diaphoretic.   HENT:      Head: Normocephalic and atraumatic.      Mouth/Throat:      Mouth: Mucous membranes are moist.      Pharynx: No oropharyngeal exudate.   Eyes:      Extraocular Movements: Extraocular movements intact.      Pupils: Pupils are equal, round, and reactive to light.   Cardiovascular:      Rate and Rhythm: Normal rate and regular rhythm.      Heart sounds: No murmur heard.  Pulmonary:      Effort: Pulmonary effort is normal. No respiratory distress.      Breath sounds: Wheezing present.   Abdominal:      General: Abdomen is flat. Bowel sounds are normal. There is no distension.      Tenderness: There is no abdominal tenderness. There is no guarding or rebound.   Musculoskeletal:         General: No swelling.      Right lower leg: No edema.      Left lower leg: No edema.   Skin:     Findings: No lesion or rash.   Neurological:      General: No focal deficit present.      Mental Status: He is alert and oriented to person, place, and time.      Cranial Nerves: No cranial  nerve deficit.      Motor: No weakness.   Psychiatric:         Mood and Affect: Mood normal.         Behavior: Behavior normal.         Outpatient Follow-Up  No future appointments.  Dwight Sawant MD  [address] [place], [place] [id number]  [phone]    Schedule an appointment as soon as possible for a visit in 1 week(s)      No Assigned Pcp Generic Provider, MD  NONE  [facility] [id number]    Follow up in 1 week(s)          Mikaela Conner MD

## 2024-05-06 NOTE — PROGRESS NOTES
Home Oxygen Evaluation       Date/Time Oxygen Action Oxygen Dose SpO2 Patient Activity During SpO2 Measurement Medical Gas Therapy O2 Delivery Method    05/06/24 1420 -- -- 84 % At rest None (Room air) --    05/06/24 1422 -- -- 89 % Walking Supplemental oxygen Nasal cannula    05/06/24 1428 -- -- 92 % At rest Supplemental oxygen Nasal cannula            Was a Home Oxygen Evaluation Performed? Yes  What was the patient's ambulation status at the time of the evaluation? Patient able to ambulate  Based on the Home Oxygen Evaluation, Does the Patient Qualify for Home Oxygen Therapy? Yes                Recommendations:               Respiratory Therapy Note

## 2024-05-07 VITALS
HEIGHT: 64 IN | WEIGHT: 160 LBS | RESPIRATION RATE: 17 BRPM | OXYGEN SATURATION: 92 % | SYSTOLIC BLOOD PRESSURE: 146 MMHG | BODY MASS INDEX: 27.31 KG/M2 | TEMPERATURE: 98.1 F | DIASTOLIC BLOOD PRESSURE: 83 MMHG | HEART RATE: 71 BPM

## 2024-05-07 LAB
ANION GAP SERPL CALC-SCNC: 9 MMOL/L (ref 10–20)
BUN SERPL-MCNC: 30 MG/DL (ref 6–23)
CALCIUM SERPL-MCNC: 8.8 MG/DL (ref 8.6–10.3)
CHLORIDE SERPL-SCNC: 102 MMOL/L (ref 98–107)
CO2 SERPL-SCNC: 29 MMOL/L (ref 21–32)
CREAT SERPL-MCNC: 1.03 MG/DL (ref 0.5–1.3)
EGFRCR SERPLBLD CKD-EPI 2021: 78 ML/MIN/1.73M*2
ERYTHROCYTE [DISTWIDTH] IN BLOOD BY AUTOMATED COUNT: 14.3 % (ref 11.5–14.5)
GLUCOSE SERPL-MCNC: 136 MG/DL (ref 74–99)
HCT VFR BLD AUTO: 40.8 % (ref 41–52)
HGB BLD-MCNC: 13.8 G/DL (ref 13.5–17.5)
HOLD SPECIMEN: NORMAL
MCH RBC QN AUTO: 29.4 PG (ref 26–34)
MCHC RBC AUTO-ENTMCNC: 33.8 G/DL (ref 32–36)
MCV RBC AUTO: 87 FL (ref 80–100)
NRBC BLD-RTO: 0 /100 WBCS (ref 0–0)
PLATELET # BLD AUTO: 374 X10*3/UL (ref 150–450)
POTASSIUM SERPL-SCNC: 4.2 MMOL/L (ref 3.5–5.3)
RBC # BLD AUTO: 4.69 X10*6/UL (ref 4.5–5.9)
SODIUM SERPL-SCNC: 136 MMOL/L (ref 136–145)
WBC # BLD AUTO: 14.4 X10*3/UL (ref 4.4–11.3)

## 2024-05-07 PROCEDURE — 2500000005 HC RX 250 GENERAL PHARMACY W/O HCPCS: Performed by: STUDENT IN AN ORGANIZED HEALTH CARE EDUCATION/TRAINING PROGRAM

## 2024-05-07 PROCEDURE — 85027 COMPLETE CBC AUTOMATED: CPT | Performed by: STUDENT IN AN ORGANIZED HEALTH CARE EDUCATION/TRAINING PROGRAM

## 2024-05-07 PROCEDURE — 94640 AIRWAY INHALATION TREATMENT: CPT

## 2024-05-07 PROCEDURE — 99231 SBSQ HOSP IP/OBS SF/LOW 25: CPT | Performed by: STUDENT IN AN ORGANIZED HEALTH CARE EDUCATION/TRAINING PROGRAM

## 2024-05-07 PROCEDURE — 2500000001 HC RX 250 WO HCPCS SELF ADMINISTERED DRUGS (ALT 637 FOR MEDICARE OP): Performed by: STUDENT IN AN ORGANIZED HEALTH CARE EDUCATION/TRAINING PROGRAM

## 2024-05-07 PROCEDURE — 99232 SBSQ HOSP IP/OBS MODERATE 35: CPT | Performed by: REGISTERED NURSE

## 2024-05-07 PROCEDURE — 2500000001 HC RX 250 WO HCPCS SELF ADMINISTERED DRUGS (ALT 637 FOR MEDICARE OP): Performed by: SURGERY

## 2024-05-07 PROCEDURE — 36415 COLL VENOUS BLD VENIPUNCTURE: CPT | Performed by: STUDENT IN AN ORGANIZED HEALTH CARE EDUCATION/TRAINING PROGRAM

## 2024-05-07 PROCEDURE — 99222 1ST HOSP IP/OBS MODERATE 55: CPT | Performed by: STUDENT IN AN ORGANIZED HEALTH CARE EDUCATION/TRAINING PROGRAM

## 2024-05-07 PROCEDURE — 2500000004 HC RX 250 GENERAL PHARMACY W/ HCPCS (ALT 636 FOR OP/ED): Performed by: STUDENT IN AN ORGANIZED HEALTH CARE EDUCATION/TRAINING PROGRAM

## 2024-05-07 PROCEDURE — 2500000002 HC RX 250 W HCPCS SELF ADMINISTERED DRUGS (ALT 637 FOR MEDICARE OP, ALT 636 FOR OP/ED): Performed by: STUDENT IN AN ORGANIZED HEALTH CARE EDUCATION/TRAINING PROGRAM

## 2024-05-07 PROCEDURE — 2500000006 HC RX 250 W HCPCS SELF ADMINISTERED DRUGS (ALT 637 FOR ALL PAYERS): Performed by: STUDENT IN AN ORGANIZED HEALTH CARE EDUCATION/TRAINING PROGRAM

## 2024-05-07 PROCEDURE — 80048 BASIC METABOLIC PNL TOTAL CA: CPT | Performed by: STUDENT IN AN ORGANIZED HEALTH CARE EDUCATION/TRAINING PROGRAM

## 2024-05-07 PROCEDURE — 94760 N-INVAS EAR/PLS OXIMETRY 1: CPT

## 2024-05-07 RX ADMIN — ACETYLCYSTEINE 600 MG: 200 SOLUTION ORAL; RESPIRATORY (INHALATION) at 11:18

## 2024-05-07 RX ADMIN — Medication 100 MG: at 08:40

## 2024-05-07 RX ADMIN — LIDOCAINE 4% 1 PATCH: 40 PATCH TOPICAL at 08:41

## 2024-05-07 RX ADMIN — Medication 4 L/MIN: at 07:30

## 2024-05-07 RX ADMIN — METHYLPREDNISOLONE SODIUM SUCCINATE 40 MG: 40 INJECTION, POWDER, FOR SOLUTION INTRAMUSCULAR; INTRAVENOUS at 02:53

## 2024-05-07 RX ADMIN — FAMOTIDINE 20 MG: 20 TABLET, FILM COATED ORAL at 08:45

## 2024-05-07 RX ADMIN — AMLODIPINE BESYLATE 10 MG: 5 TABLET ORAL at 08:40

## 2024-05-07 RX ADMIN — ACETYLCYSTEINE 600 MG: 200 SOLUTION ORAL; RESPIRATORY (INHALATION) at 07:29

## 2024-05-07 RX ADMIN — Medication 4 L/MIN: at 14:28

## 2024-05-07 RX ADMIN — Medication 1 TABLET: at 08:45

## 2024-05-07 RX ADMIN — IPRATROPIUM BROMIDE AND ALBUTEROL SULFATE 3 ML: 2.5; .5 SOLUTION RESPIRATORY (INHALATION) at 07:28

## 2024-05-07 RX ADMIN — VALSARTAN 80 MG: 80 TABLET, FILM COATED ORAL at 08:40

## 2024-05-07 RX ADMIN — PHENOBARBITAL 64.8 MG: 32.4 TABLET ORAL at 08:45

## 2024-05-07 RX ADMIN — FOLIC ACID 1 MG: 1 TABLET ORAL at 08:45

## 2024-05-07 RX ADMIN — IPRATROPIUM BROMIDE AND ALBUTEROL SULFATE 3 ML: 2.5; .5 SOLUTION RESPIRATORY (INHALATION) at 11:18

## 2024-05-07 RX ADMIN — Medication 21 PERCENT: at 14:24

## 2024-05-07 ASSESSMENT — LIFESTYLE VARIABLES
TREMOR: NO TREMOR
TOTAL SCORE: 0
VISUAL DISTURBANCES: NOT PRESENT
ANXIETY: NO ANXIETY, AT EASE
PAROXYSMAL SWEATS: NO SWEAT VISIBLE
HEADACHE, FULLNESS IN HEAD: NOT PRESENT
NAUSEA AND VOMITING: NO NAUSEA AND NO VOMITING
AUDITORY DISTURBANCES: NOT PRESENT
AGITATION: NORMAL ACTIVITY
ORIENTATION AND CLOUDING OF SENSORIUM: ORIENTED AND CAN DO SERIAL ADDITIONS

## 2024-05-07 ASSESSMENT — PAIN SCALES - GENERAL: PAINLEVEL_OUTOF10: 5 - MODERATE PAIN

## 2024-05-07 ASSESSMENT — ENCOUNTER SYMPTOMS
ALLERGIC/IMMUNOLOGIC NEGATIVE: 1
ENDOCRINE NEGATIVE: 1
GASTROINTESTINAL NEGATIVE: 1
PSYCHIATRIC NEGATIVE: 1
CARDIOVASCULAR NEGATIVE: 1
RESPIRATORY NEGATIVE: 1
NEUROLOGICAL NEGATIVE: 1
MUSCULOSKELETAL NEGATIVE: 1
HEMATOLOGIC/LYMPHATIC NEGATIVE: 1
EYES NEGATIVE: 1
CONSTITUTIONAL NEGATIVE: 1

## 2024-05-07 NOTE — CONSULTS
Inpatient consult to Orthopaedic Surgery  Consult performed by: SABRINA Cristobal  Consult ordered by: SABRINA Sousa  Reason for consult: endplate fracture          Consult Note  Patient: Elton Lilly  Unit/Bed: 1022/1022-A  YOB: 1950  MRN: [id number]  Acct: [id number]   Admitting Diagnosis: Hypoxemia [R09.02]  Fall, initial encounter [W19.XXXA]  Closed fracture of multiple ribs of left side, initial encounter [S22.42XA]  Date:  5/4/2024  Hospital Day: 3    Complaint:  T12 endplate fracture     History of Present Illness:  Elton Lilly is a 73 year old male patient per chart review with history of COPD, HTN admitted to [facility] for hypoxemia, multiple rib fractures and found to have T12 endplate fracture in which orthopedics was consulted for evaluation and treatment recommendations.     PMHx:  Past Medical History:   Diagnosis Date    COPD (chronic obstructive pulmonary disease) (Multi)     Hypertension        PSHx:  History reviewed. No pertinent surgical history.    Social Hx:  Social History     Socioeconomic History    Marital status: Single     Spouse name: None    Number of children: None    Years of education: None    Highest education level: None   Occupational History    None   Tobacco Use    Smoking status: Every Day     Current packs/day: 1.00     Average packs/day: 1 pack/day for 55.0 years (55.0 ttl pk-yrs)     Types: Cigarettes    Smokeless tobacco: Never   Vaping Use    Vaping status: Never Used   Substance and Sexual Activity    Alcohol use: Defer     Alcohol/week: 5.0 standard drinks of alcohol     Types: 5 Cans of beer per week    Drug use: Yes     Types: Marijuana    Sexual activity: Defer   Other Topics Concern    None   Social History Narrative    None     Social Determinants of Health     Financial Resource Strain: Low Risk  (11/15/2023)    Overall Financial Resource Strain (CARDIA)     Difficulty of Paying Living Expenses: Not very hard   Food  Insecurity: Not on file   Transportation Needs: No Transportation Needs (11/15/2023)    PRAPARE - Transportation     Lack of Transportation (Medical): No     Lack of Transportation (Non-Medical): No   Physical Activity: Not on file   Stress: Not on file   Social Connections: Not on file   Intimate Partner Violence: Not on file   Housing Stability: Low Risk  (11/15/2023)    Housing Stability Vital Sign     Unable to Pay for Housing in the Last Year: No     Number of Places Lived in the Last Year: 1     Unstable Housing in the Last Year: No       Family Hx:  No family history on file.    Review of Systems:   Review of Systems   Constitutional: Negative.    HENT: Negative.     Eyes: Negative.    Respiratory: Negative.     Cardiovascular: Negative.    Gastrointestinal: Negative.    Endocrine: Negative.    Genitourinary: Negative.    Musculoskeletal: Negative.    Skin: Negative.    Allergic/Immunologic: Negative.    Neurological: Negative.    Hematological: Negative.    Psychiatric/Behavioral: Negative.           Physical Examination:    Visit Vitals  /83   Pulse 71   Temp 36.7 °C (98.1 °F)   Resp 17      Physical Exam  Vitals and nursing note reviewed.   Constitutional:       General: He is not in acute distress.  HENT:      Head: Normocephalic.      Nose: Nose normal.   Eyes:      Extraocular Movements: Extraocular movements intact.      Pupils: Pupils are equal, round, and reactive to light.   Cardiovascular:      Rate and Rhythm: Normal rate and regular rhythm.   Abdominal:      General: Bowel sounds are normal.      Palpations: Abdomen is soft.   Musculoskeletal:      Cervical back: Normal range of motion.      Comments: There is no pain to palpation of back. No midline spinal process tenderness or step off appreciated.    Skin:     General: Skin is warm.      Capillary Refill: Capillary refill takes less than 2 seconds.   Neurological:      General: No focal deficit present.      Mental Status: He is alert.  "  Psychiatric:         Mood and Affect: Mood normal.         Behavior: Behavior normal.         LABS:  CBC:   Lab Results   Component Value Date    WBC 14.4 (H) 05/07/2024    RBC 4.69 05/07/2024    HGB 13.8 05/07/2024    HCT 40.8 (L) 05/07/2024    MCV 87 05/07/2024    MCH 29.4 05/07/2024    MCHC 33.8 05/07/2024    RDW 14.3 05/07/2024     05/07/2024     CBC with Differential:    Lab Results   Component Value Date    WBC 14.4 (H) 05/07/2024    RBC 4.69 05/07/2024    HGB 13.8 05/07/2024    HCT 40.8 (L) 05/07/2024     05/07/2024    MCV 87 05/07/2024    MCH 29.4 05/07/2024    MCHC 33.8 05/07/2024    RDW 14.3 05/07/2024    NRBC 0.0 05/07/2024    LYMPHOPCT 3.0 05/04/2024    MONOPCT 7.9 05/04/2024    EOSPCT 0.2 05/04/2024    BASOPCT 0.3 05/04/2024    MONOSABS 1.39 (H) 05/04/2024    LYMPHSABS 0.53 (L) 05/04/2024    EOSABS 0.03 05/04/2024    BASOSABS 0.06 05/04/2024     CMP:    Lab Results   Component Value Date     05/07/2024    K 4.2 05/07/2024     05/07/2024    CO2 29 05/07/2024    BUN 30 (H) 05/07/2024    CREATININE 1.03 05/07/2024    GLUCOSE 136 (H) 05/07/2024    PROT 7.7 05/04/2024    CALCIUM 8.8 05/07/2024    BILITOT 0.8 05/04/2024    ALKPHOS 152 (H) 05/04/2024    AST 16 05/04/2024    ALT 16 05/04/2024     BMP:    Lab Results   Component Value Date     05/07/2024    K 4.2 05/07/2024     05/07/2024    CO2 29 05/07/2024    BUN 30 (H) 05/07/2024    CREATININE 1.03 05/07/2024    CALCIUM 8.8 05/07/2024    GLUCOSE 136 (H) 05/07/2024     Magnesium:  Lab Results   Component Value Date    MG 1.80 05/04/2024     Troponin:  No results found for: \"TROPONINI\"    Imaging   XR chest 1 view    Result Date: 5/6/2024  Interpreted By:  Jaziel Khan, STUDY: XR CHEST 1 VIEW;  5/6/2024 5:28 pm   INDICATION: Signs/Symptoms:s/p rib fracture.   COMPARISON: 5/4/2024   ACCESSION NUMBER(S): [id number]   ORDERING CLINICIAN: JONI SPENCER   FINDINGS: The cardiac silhouette is normal in size. There is " asymmetric elevation right diaphragm right basilar opacity correspond to combination of pleural effusion and basilar atelectasis and airspace disease. There is left basilar atelectasis. No pneumothorax. Right rib fractures are better assessed on the recent CT chest examination.       Stable asymmetric elevation of right diaphragm and right basilar opacity compatible with combination of small pleural effusion and basilar atelectasis and airspace disease. Left basilar subsegmental atelectasis.   Right rib fractures are better assessed on recent CT chest examination.   MACRO: None   Signed by: Jaziel Khan 5/6/2024 9:29 PM Dictation workstation:   [id number]    ECG 12 lead    Result Date: 5/4/2024  Sinus rhythm with occasional Premature ventricular complexes Otherwise normal ECG When compared with ECG of 12-MAR-2024 11:44, Premature ventricular complexes are now Present QT has shortened See ED provider note for full interpretation and clinical correlation Confirmed by Odette Reardon ([id number]) on 5/4/2024 3:02:22 PM    CT chest abdomen pelvis w IV contrast    Result Date: 5/4/2024  Interpreted By:  Schoenberger, Joseph, STUDY: CT CHEST ABDOMEN PELVIS W IV CONTRAST;  5/4/2024 9:51 am   INDICATION: Signs/Symptoms:Fall, left lateral posterior rib pain   COMPARISON: 11/25/2022   ACCESSION NUMBER(S): [id number]   ORDERING CLINICIAN: ANABELLA KAPLAN   TECHNIQUE: CT of the chest, abdomen, and pelvis was performed. Contiguous axial images were obtained at  5 mm slice thickness through the chest, and at  3 mm through the abdomen and pelvis. Coronal and sagittal reconstructions at  3 mm slice thickness were performed. 75 cc of Omnipaque 350 were injected for the exam   FINDINGS: CHEST:   LUNG/PLEURA/LARGE AIRWAYS: There is extensive mucous plugging in the right bronchus intermedius extending into the right middle and lower lobe bronchi. There is complete atelectasis of the basal segments right lower lobe and partial atelectasis of  the right middle lobe. In the aerated right upper lobe there are several very small cavitary lesions. Correlate for atypical infectious etiology. Distribution atypical for septic emboli. No similar findings are noted on the left. Recommend chest CT follow-up after treatment. No significant focal left lung opacity is evident. There is no pneumothorax. There is a minimal amount of dependent layering right pleural fluid   VESSELS: No traumatic aortic injury is appreciated within the limitations of this non-EKG gated study.  The thoracic aorta is of normal course and caliber.  Main pulmonary artery and its branches are normal in caliber.  There are mild coronary atherosclerotic calcifications.   HEART: The heart is normal in size.   There is no pericardial effusion.   MEDIASTINUM AND KORI: No pneumomediastinum, abnormal mediastinal fluid collection or mediastinal hematoma are appreciated.  No mediastinal, hilar or biaxillary adenopathy is present.  The esophagus is normal in course and caliber.   CHEST WALL AND LOWER NECK: There is an undisplaced fracture of the posterolateral left 9th rib. There is a minimally displaced fracture of the posterior left 8th rib. No other thoracic fractures are identified other than the previously described minimal T12 superior endplate vertebral body fracture. No aggressive osteolytic or osteoblastic lesion. Soft tissues of the chest wall appear intact with the exception of some minimal infiltration of the soft tissues abutting the rib fractures without large hematoma.   ABDOMEN:   LIVER: No focal perfusion abnormality of the liver is appreciated to suggest contusion or laceration. There is no subcapsular hematoma, no perihepatic fluid collection.   GALLBLADDER: The gallbladder is nondistended without evidence of radiopaque stone.   BILE DUCTS: The intahepatic and extrahepatic bile ducts are not dilated.   PANCREAS: The pancreas appears unremarkable.   SPLEEN: No parenchymal perfusion  deficit of the spleen is appreciated to suggest contusion or laceration. There is no subcapsular hematoma, no perisplenic fluid collection.   ADRENAL GLANDS: The bilateral adrenal glands are unremarkable in appearance.   KIDNEYS AND URETERS: No parenchymal perfusion deficit is appreciated in bilateral kidneys to suggest contusion or laceration. There is no subcapsular hematoma, no perinephric fluid collection.  No hydroureteral nephrosis or ureter stone is noted. There are several left renal cysts and 1 left renal angiomyolipoma that are unchanged from the previous exam. Small right renal cysts are also unchanged.   PELVIS:   BLADDER: Mild circumferential wall thickening of the urinary bladder unchanged.   REPRODUCTIVE ORGANS: The prostate is enlarged.   BOWEL: The stomach is unremarkable.  The small bowel is normal in caliber without evidence of focal wall thickening or obstruction.  There is no evidence of focal wall thickening or dilatation of the large bowel.   VESSELS: There are atherosclerotic calcifications of the abdominal aorta with no adjacent hematoma or no aneurysmal dilation. The principal vasculature of the abdomen and pelvis is patent.   PERITONEUM/RETROPERITONEUM/LYMPH NODES: There is no evidence of intra- or retroperitoneal hematoma.  There is no free or loculated fluid collection, no free intraperitoneal air. No abdominopelvic lymphadenopathy is present.   BONES AND ABDOMINAL WALL: No evidence of acute fracture or dislocation of the included osseous structures.  No suspicious osseous lesions are identified.  There is a fat containing left inguinal hernia. Abdominal wall structures appear otherwise intact.       CHEST 1.  There are acute fractures of the left-sided 8th and 9th ribs. No overt associated complications. 2. There is mucous plugging in the right bronchus intermedius extending into the middle and lower lobe bronchi. There is associated partial atelectasis of the right middle lobe and  complete atelectasis of the basal segments of the right lower lobe. 3. The minimal superior endplate fracture at T12 is again noted. 4. There is only trace left pleural effusion. There is no pneumothorax.   ABDOMEN - PELVIS 1.  No definite acute abdomen or pelvis finding.     MACRO: None   Signed by: Joseph Schoenberger 5/4/2024 10:36 AM Dictation workstation:   [id number]    CT lumbar spine wo IV contrast    Result Date: 5/4/2024  Interpreted By:  Schoenberger, Joseph, STUDY: CT LUMBAR SPINE WO IV CONTRAST  5/4/2024 9:50 am   INDICATION: Signs/Symptoms:Fall   COMPARISON: None.   ACCESSION NUMBER(S): [id number]   ORDERING CLINICIAN: ANABELLA KAPLAN   TECHNIQUE: Axial CT images of the lumbar spine are obtained. Axial, coronal and sagittal reconstructions are provided for review.   FINDINGS: Alignment:  Within normal limits.   Vertebrae/Disc Spaces:   The mild superior endplate fracture at the T12 level is again noted. The other lumbar vertebra are maintained in height. There is severe degenerative narrowing of the L4 and L5 discs. More mild narrowing at other lumbar levels.   Lower Thoracic Spine:  There is no significant central canal stenosis in the included lower thoracic region.   T12-L1:  Mild degenerative narrowing of the disc without canal or foraminal stenosis   L1-2:  Mild degenerative narrowing of the disc without canal or foraminal stenosis   L2-3:  Mild degenerative narrowing of the disc without canal or foraminal stenosis.   L3-4:  Loss foraminal height and some disc osteophyte complex results in mild stenosis of the right neural foramen.   L4-5:  Mild bilateral foraminal stenosis due to loss foraminal height.   L5-S1:  Loss foraminal height and disc osteophyte complex results in relatively severe stenosis in the left neural foramina mild stenosis in the right neural foramen.   Prevertebral/Paraspinal Soft Tissues: The prevertebral and paraspinal soft tissues are unremarkable.       The mild superior  endplate fracture at the T12 level is again noted. No other fracture or no acute subluxation or compromise the spinal canal on this exam.   MACRO: None   Signed by: Joseph Schoenberger 5/4/2024 10:24 AM Dictation workstation:   [id number]    CT thoracic spine wo IV contrast    Result Date: 5/4/2024  Interpreted By:  Schoenberger, Joseph, STUDY: CT THORACIC SPINE WO IV CONTRAST;  5/4/2024 9:50 am   INDICATION: Signs/Symptoms:Fall.   COMPARISON: 11/25/2022   ACCESSION NUMBER(S): [id number]   ORDERING CLINICIAN: ANABELLA KAPLAN   TECHNIQUE: Axial CT images of the thoracic spine are obtained. Axial, coronal and sagittal reconstructions are submitted for review.   FINDINGS:     Alignment: There is a significant dextroconvex mid and lower thoracic scoliotic curve with compensatory upper thoracic levoconvex curve. The sagittal alignment of the vertebra is maintained.   Vertebrae/Intervertebral Discs: There is new diffuse volume loss is 6 of the T5 vertebral body. This is rather mild. Upon close analysis of thin section axial images no convincing evidence for acute fracture lines noted.   There is new mild superior endplate depression at the T9 level. This appears to be associated with Schmorl's node formation without evidence for acute fracture lines.   There is new superior endplate depression at T10. Again no acute fracture lines are convincingly identified on the thin-section images. All   There is a fracture line which may be acute or subacute paralleling the superior endplate of T12. There is mild volume loss of the T12 vertebral body. There is no involvement of posterior elements. It extends to the right anterior corner. It is not apparent previously. There is relatively mild multilevel degenerative disc narrowing with several vacuum discs. There is no significant central canal stenosis.   Paraspinous Soft Tissues: Unremarkable   The posterior elements are aligned and intact without fracture or subluxation.        There is a possible new acute mild superior endplate fracture at the T12 level paralleling the anterior endplate extending to the right anterior corner.   There are interval several mild compression fractures without overt acute fracture lines on thin-section axial images and there are discussed in detail above.   No overt compromise the spinal canal on this exam.   MACRO: None   Signed by: Joseph Schoenberger 5/4/2024 10:19 AM Dictation workstation:   [id number]    CT cervical spine wo IV contrast    Result Date: 5/4/2024  Interpreted By:  Schoenberger, Joseph, STUDY: CT CERVICAL SPINE WO IV CONTRAST;  5/4/2024 9:50 am   INDICATION: Signs/Symptoms:Fall.   COMPARISON: None.   ACCESSION NUMBER(S): [id number]   ORDERING CLINICIAN: ANABELLA KAPLAN   TECHNIQUE: Axial CT images of the cervical spine are obtained. Axial, coronal and sagittal reconstructions are provided for review.   FINDINGS:     Fractures: There is no evidence for an acute fracture of the cervical spine.   Vertebral Alignment: There is a reversal of the normal lordotic curve in the cervical spine. The sagittal alignment is otherwise maintained.   Craniocervical Junction: Intact other than degenerative changes.   Vertebrae/Disc Spaces:  There is severe degenerative narrowing is associated discogenic endplate sclerosis involving the C2, C3, C4, C5, and C6 and C7 disc. Discogenic endplate spurs at these levels. Multilevel foraminal encroachment.   Prevertebral/Paraspinal Soft Tissues: The prevertebral and paraspinal soft tissues are unremarkable.   There is relatively mild multilevel facet arthrosis. Posterior elements are otherwise intact without fracture or subluxation.       Severe multilevel cervical spondylosis. No acute fracture or subluxation.   MACRO: None   Signed by: Joseph Schoenberger 5/4/2024 10:08 AM Dictation workstation:   [id number]    CT head wo IV contrast    Result Date: 5/4/2024  Interpreted By:  Schoenberger, Joseph, STUDY: CT  HEAD WO IV CONTRAST;  5/4/2024 9:50 am   INDICATION: Signs/Symptoms:Fall.   COMPARISON: None.   ACCESSION NUMBER(S): [id number]   ORDERING CLINICIAN: ANABELLA KAPLAN   TECHNIQUE: Noncontrast axial CT scan of head was performed. Angled reformats in brain and bone windows were generated. The images were reviewed in bone, brain, blood and soft tissue windows.   FINDINGS: CSF Spaces: Enlarged due to parenchymal volume loss. Normal configuration with a type basal cisterns. There is no extraaxial fluid collection.   Parenchyma:  The grey-white differentiation is intact. There is no mass effect or midline shift.  There is no intracranial hemorrhage.   Calvarium: The calvarium is unremarkable.   Paranasal sinuses and mastoids: Visualized paranasal sinuses and mastoids are clear.       No evidence of acute cortical infarct or intracranial hemorrhage.   No evidence of intracranial hemorrhage or displaced skull fracture.   MACRO: None   Signed by: Joseph Schoenberger 5/4/2024 10:05 AM Dictation workstation:   [id number]    XR chest 1 view    Result Date: 5/4/2024  Interpreted By:  Schoenberger, Joseph, STUDY: XR CHEST 1 VIEW;  5/4/2024 8:42 am   INDICATION: Signs/Symptoms:Shortness of breath.   COMPARISON: 03/12/2024   ACCESSION NUMBER(S): [id number]   ORDERING CLINICIAN: ANABELLA KAPLAN   FINDINGS:         CARDIOMEDIASTINAL SILHOUETTE: Cardiomediastinal silhouette is normal in size and configuration.   LUNGS: There is new volume loss in the right hemithorax with some mediastinal shift to the right. There is opacity in the lower right hemithorax. There is likely at least partial atelectasis of the right lower lobe and some elevation of the right hemidiaphragm which is new from the prior.   ABDOMEN: No remarkable upper abdominal findings.   BONES: No acute osseous changes.       1.  New elevation right hemidiaphragm at least partial atelectasis of the right lower lobe with volume loss the right hemithorax. See discussion above        MACRO: None   Signed by: Joseph Schoenberger 5/4/2024 8:46 AM Dictation workstation:   [id number]        Assessment:    73 year old male patient admitted for multiple rib fractures and hypoemia found to have an old endplate fracture at T12.     Upon assessment patient does not have any back pain at this time and has not had any. Images were reviewed and show a remote T12 endplate fracture that does not require surgical intervention or brace application.     Has good bilateral lower extremity strength 5/5.     Orthopedics will sign off.        Plan:  WBAT to bilateral lower extremities.   Orthopedics will sign off. Please call for any questions or concerns.      In a face to face encounter, I evaluated the patient and performed a physical examination, discussed pertinent diagnostic studies if indicated and discussed diagnosis and management strategies with both the patient and physician assistant / nurse practitioner.  I reviewed the PA/NP's note and agree with the documented findings and plan of care.       Austin Kiser III, MD      Electronically signed by SABRINA Cristobal on 5/7/2024 at 8:26 AM

## 2024-05-07 NOTE — CARE PLAN
The patient's goals for the shift include      The clinical goals for the shift include maintina spo2 above 92 on 4 liters      Problem: Pain  Goal: My pain/discomfort is manageable  Outcome: Progressing

## 2024-05-07 NOTE — NURSING NOTE
Home Oxygen Evaluation       Date/Time Oxygen Action Oxygen Dose SpO2 Patient Activity During SpO2 Measurement Medical Gas Therapy O2 Delivery Method    05/07/24 1424 Rate Verify Medical Gas *21 percent 88 % At rest None (Room air) --    05/07/24 1428 Rate Verify Medical Gas *4 L/min 92 % With exertion Supplemental oxygen Nasal cannula            Was a Home Oxygen Evaluation Performed? Yes  What was the patient's ambulation status at the time of the evaluation? Patient able to ambulate  Based on the Home Oxygen Evaluation, Does the Patient Qualify for Home Oxygen Therapy? Yes  Was the Order Obtained for Home Oxygen Therapy? No  The Patient's Primary Pulmonary Diagnosis is COPD.  [employer] is the Patient's Preferred DME Company.  Was the DME Company Notified of Home Oxygen Therapy Needs? Yes    Recommendations:    Recommended Oxygen Dose at Rest is 4 L/min   Recommended Oxygen Dose with Activity is 4 L/min   Was a Nocturnal Pulse Ox or Sleep Study performed? NoIf yes,

## 2024-05-07 NOTE — PROGRESS NOTES
Medical Group Progress Note  ASSESSMENT & PLAN:     #Acute on chronic hypoxic respiratory failure  #COPD with acute exacerbation  #Left eighth and ninth rib fracture  #Right lower lung atelectasis collapse mucous plug  #Status post fall     Continue oxygen supplement per nasal cannula  Titrate down as tolerated  Incentive spirometry  Continue Solu-Medrol DuoNeb nebulization  Continue antibiotics  Mucomyst with nebulization  Chest therapy     #History of CAD  #History of hyperlipidemia and hypertension  Continue home medications     #Daily alcohol use  #Marijuana use  #Current active smoker  Nicotine patch  [facility] protocol  Ativan per [facility] protocol  Thiamine and folate      Patient was not discharged yesterday due to oxygen requirements being around 6 L yesterday evening.  Oxygen requirements are now down to 4 L with good saturation even on activity.  Patient will be discharged home with oxygen with plan to follow-up with pulmonology.         Princess Callahan MD    SUBJECTIVE       Patient seen and assessed.  Patient in no acute distress.  Patient on 4 L oxygen nasal cannula.  Denies any fevers chills nausea or vomiting.  Denies any worsening shortness of breath or cough.    OBJECTIVE:     Last Recorded Vitals:  Vitals:    05/07/24 0738 05/07/24 1119 05/07/24 1424 05/07/24 1428   BP: 146/83      BP Location: Right arm      Patient Position: Lying      Pulse: 71      Resp: 17      Temp: 36.7 °C (98.1 °F)      TempSrc: Temporal      SpO2: 95% 90% (!) 88% 92%   Weight:       Height:         Last I/O:  I/O last 3 completed shifts:  In: 590 (8.1 mL/kg) [P.O.:590]  Out: - (0 mL/kg)   Weight: 72.6 kg     Physical Exam    Constitutional:       General: He is not in acute distress.     Appearance: Normal appearance. He is not ill-appearing, toxic-appearing or diaphoretic.   HENT:      Head: Normocephalic and atraumatic.      Mouth/Throat:      Mouth: Mucous membranes are moist.      Pharynx: No oropharyngeal exudate.    Eyes:      Extraocular Movements: Extraocular movements intact.      Pupils: Pupils are equal, round, and reactive to light.   Cardiovascular:      Rate and Rhythm: Normal rate and regular rhythm.      Heart sounds: No murmur heard.  Pulmonary:      Effort: Pulmonary effort is normal. No respiratory distress.      Breath sounds: Wheezing present.   Abdominal:      General: Abdomen is flat. Bowel sounds are normal. There is no distension.      Tenderness: There is no abdominal tenderness. There is no guarding or rebound.   Musculoskeletal:         General: No swelling.      Right lower leg: No edema.      Left lower leg: No edema.   Skin:     Findings: No lesion or rash.   Neurological:      General: No focal deficit present.      Mental Status: He is alert and oriented to person, place, and time.      Cranial Nerves: No cranial nerve deficit.      Motor: No weakness.   Psychiatric:         Mood and Affect: Mood normal.         Behavior: Behavior normal.        Inpatient Medications:  acetylcysteine, 3 mL, nebulization, 4x daily  amLODIPine, 10 mg, oral, Daily  atorvastatin, 40 mg, oral, Nightly  enoxaparin, 40 mg, subcutaneous, q24h  famotidine, 20 mg, oral, BID   Or  famotidine, 20 mg, intravenous, BID  folic acid, 1 mg, oral, Daily  ipratropium-albuteroL, 3 mL, nebulization, 4x daily  levoFLOXacin, 750 mg, intravenous, q24h  lidocaine, 1 patch, transdermal, Daily  magnesium oxide, 400 mg, oral, q24h  methylPREDNISolone sodium succinate (PF), 40 mg, intravenous, q12h  multivitamin with minerals, 1 tablet, oral, Daily  nicotine, 1 patch, transdermal, Daily  oxygen, , inhalation, Continuous - Inhalation  PHENobarbitaL, 64.8 mg, oral, TID   Followed by  [START ON 5/8/2024] PHENobarbitaL, 32.4 mg, oral, TID  thiamine, 100 mg, oral, Daily  tiotropium, 2 puff, inhalation, Daily  valsartan, 80 mg, oral, Daily    PRN Medications  PRN medications: acetaminophen **OR** acetaminophen **OR** acetaminophen, acetaminophen  **OR** acetaminophen **OR** acetaminophen, ipratropium-albuteroL, LORazepam **OR** LORazepam **OR** LORazepam, melatonin, ondansetron **OR** ondansetron, oxyCODONE, polyethylene glycol  Continuous Medications:  oxygen, , Last Rate: 5 L/min (05/04/24 0902)      LABS AND IMAGING:     Labs:  Results from last 7 days   Lab Units 05/07/24 0415 05/06/24  0840 05/05/24 0524   WBC AUTO x10*3/uL 14.4* 18.2* 15.2*   RBC AUTO x10*6/uL 4.69 4.91 4.97   HEMOGLOBIN g/dL 13.8 14.6 14.6   HEMATOCRIT % 40.8* 43.1 42.9   MCV fL 87 88 86   MCH pg 29.4 29.7 29.4   MCHC g/dL 33.8 33.9 34.0   RDW % 14.3 14.2 14.1   PLATELETS AUTO x10*3/uL 374 425 366     Results from last 7 days   Lab Units 05/07/24  0415 05/06/24  0840 05/05/24  0524 05/04/24  0830   SODIUM mmol/L 136 135* 133* 135*   POTASSIUM mmol/L 4.2 4.1 4.1 3.9   CHLORIDE mmol/L 102 98 97* 96*   CO2 mmol/L 29 30 29 31   BUN mg/dL 30* 28* 21 13   CREATININE mg/dL 1.03 1.05 1.01 0.99   GLUCOSE mg/dL 136* 184* 148* 116*   PROTEIN TOTAL g/dL  --   --   --  7.7   CALCIUM mg/dL 8.8 9.2 9.4 10.7*   BILIRUBIN TOTAL mg/dL  --   --   --  0.8   ALK PHOS U/L  --   --   --  152*   AST U/L  --   --   --  16   ALT U/L  --   --   --  16     Results from last 7 days   Lab Units 05/04/24  0830   MAGNESIUM mg/dL 1.80     Results from last 7 days   Lab Units 05/04/24  1009 05/04/24  0830   TROPHS ng/L 12 11     Imaging:  XR chest 1 view  Narrative: Interpreted By:  Jaziel Khan,   STUDY:  XR CHEST 1 VIEW;  5/6/2024 5:28 pm      INDICATION:  Signs/Symptoms:s/p rib fracture.      COMPARISON:  5/4/2024      ACCESSION NUMBER(S):  [id number]      ORDERING CLINICIAN:  JONI SPENCER      FINDINGS:  The cardiac silhouette is normal in size. There is asymmetric  elevation right diaphragm right basilar opacity correspond to  combination of pleural effusion and basilar atelectasis and airspace  disease. There is left basilar atelectasis. No pneumothorax. Right  rib fractures are better assessed on the  recent CT chest examination.      Impression: Stable asymmetric elevation of right diaphragm and right basilar  opacity compatible with combination of small pleural effusion and  basilar atelectasis and airspace disease. Left basilar subsegmental  atelectasis.      Right rib fractures are better assessed on recent CT chest  examination.      MACRO:  None      Signed by: Jaziel Khan 5/6/2024 9:29 PM  Dictation workstation:   [id number]

## 2024-05-07 NOTE — PROGRESS NOTES
General Surgery Progress Note    Patient: Elton Lilly  Unit/Bed: 1022/1022-A  YOB: 1950  MRN: [id number]  Acct: [id number]   Admitting Diagnosis: Hypoxemia [R09.02]  Fall, initial encounter [W19.XXXA]  Closed fracture of multiple ribs of left side, initial encounter [S22.42XA]  Date:  5/4/2024  Hospital Day: 3  Attending: Princess Callahan MD    Complaint:  Chief Complaint   Patient presents with    Rib Injury     Pt fell Thursday night and is having L sided rib pain and sob, denies hitting his head, no blood thinners       Subjective  Patient seen and examined this morning. No acute events overnight.  Patient states he still having pain to left chest wall.  He states after breathing treatments he does cough up white phlegm.  He denies being short of breath.    PHYSICAL EXAM:  Physical Exam  Vitals reviewed.   Constitutional:       General: He is not in acute distress.  HENT:      Head: Normocephalic.      Nose: Nose normal.      Mouth/Throat:      Mouth: Mucous membranes are moist.   Cardiovascular:      Rate and Rhythm: Normal rate.   Pulmonary:      Effort: Pulmonary effort is normal.      Breath sounds: Examination of the left-lower field reveals decreased breath sounds. Decreased breath sounds present.   Chest:      Chest wall: Tenderness (Left) present.   Abdominal:      General: Abdomen is flat. Bowel sounds are normal.      Palpations: Abdomen is soft.   Skin:     General: Skin is warm.      Capillary Refill: Capillary refill takes less than 2 seconds.   Neurological:      General: No focal deficit present.      Mental Status: He is alert and oriented to person, place, and time.   Psychiatric:         Mood and Affect: Mood normal.       Vital signs in last 24 hours:  Vitals:    05/07/24 0738   BP: 146/83   Pulse: 71   Resp: 17   Temp: 36.7 °C (98.1 °F)   SpO2: 95%     Intake/Output this shift:    Intake/Output Summary (Last 24 hours) at 5/7/2024 6453  Last data filed at 5/6/2024 0900  Gross per  24 hour   Intake 590 ml   Output --   Net 590 ml      Allergies:  Allergies   Allergen Reactions    Bee Venom Protein (Honey Bee) Swelling     Bee Stings      Medications:  Scheduled medications  acetylcysteine, 3 mL, nebulization, 4x daily  amLODIPine, 10 mg, oral, Daily  atorvastatin, 40 mg, oral, Nightly  enoxaparin, 40 mg, subcutaneous, q24h  famotidine, 20 mg, oral, BID   Or  famotidine, 20 mg, intravenous, BID  folic acid, 1 mg, oral, Daily  ipratropium-albuteroL, 3 mL, nebulization, 4x daily  levoFLOXacin, 750 mg, intravenous, q24h  lidocaine, 1 patch, transdermal, Daily  magnesium oxide, 400 mg, oral, q24h  methylPREDNISolone sodium succinate (PF), 40 mg, intravenous, q12h  multivitamin with minerals, 1 tablet, oral, Daily  nicotine, 1 patch, transdermal, Daily  oxygen, , inhalation, Continuous - Inhalation  PHENobarbitaL, 64.8 mg, oral, TID   Followed by  [START ON 5/8/2024] PHENobarbitaL, 32.4 mg, oral, TID  thiamine, 100 mg, oral, Daily  tiotropium, 2 puff, inhalation, Daily  valsartan, 80 mg, oral, Daily      Continuous medications  oxygen, , Last Rate: 5 L/min (05/04/24 0902)      PRN medications  PRN medications: acetaminophen **OR** acetaminophen **OR** acetaminophen, acetaminophen **OR** acetaminophen **OR** acetaminophen, ipratropium-albuteroL, LORazepam **OR** LORazepam **OR** LORazepam, melatonin, ondansetron **OR** ondansetron, oxyCODONE, polyethylene glycol  Labs:  Results for orders placed or performed during the hospital encounter of 05/04/24 (from the past 24 hour(s))   CBC   Result Value Ref Range    WBC 18.2 (H) 4.4 - 11.3 x10*3/uL    nRBC 0.0 0.0 - 0.0 /100 WBCs    RBC 4.91 4.50 - 5.90 x10*6/uL    Hemoglobin 14.6 13.5 - 17.5 g/dL    Hematocrit 43.1 41.0 - 52.0 %    MCV 88 80 - 100 fL    MCH 29.7 26.0 - 34.0 pg    MCHC 33.9 32.0 - 36.0 g/dL    RDW 14.2 11.5 - 14.5 %    Platelets 425 150 - 450 x10*3/uL   Basic Metabolic Panel   Result Value Ref Range    Glucose 184 (H) 74 - 99 mg/dL     Sodium 135 (L) 136 - 145 mmol/L    Potassium 4.1 3.5 - 5.3 mmol/L    Chloride 98 98 - 107 mmol/L    Bicarbonate 30 21 - 32 mmol/L    Anion Gap 11 10 - 20 mmol/L    Urea Nitrogen 28 (H) 6 - 23 mg/dL    Creatinine 1.05 0.50 - 1.30 mg/dL    eGFR 76 >60 mL/min/1.73m*2    Calcium 9.2 8.6 - 10.3 mg/dL   SST TOP   Result Value Ref Range    Extra Tube Hold for add-ons.    SST TOP   Result Value Ref Range    Extra Tube Hold for add-ons.    CBC   Result Value Ref Range    WBC 14.4 (H) 4.4 - 11.3 x10*3/uL    nRBC 0.0 0.0 - 0.0 /100 WBCs    RBC 4.69 4.50 - 5.90 x10*6/uL    Hemoglobin 13.8 13.5 - 17.5 g/dL    Hematocrit 40.8 (L) 41.0 - 52.0 %    MCV 87 80 - 100 fL    MCH 29.4 26.0 - 34.0 pg    MCHC 33.8 32.0 - 36.0 g/dL    RDW 14.3 11.5 - 14.5 %    Platelets 374 150 - 450 x10*3/uL   Basic Metabolic Panel   Result Value Ref Range    Glucose 136 (H) 74 - 99 mg/dL    Sodium 136 136 - 145 mmol/L    Potassium 4.2 3.5 - 5.3 mmol/L    Chloride 102 98 - 107 mmol/L    Bicarbonate 29 21 - 32 mmol/L    Anion Gap 9 (L) 10 - 20 mmol/L    Urea Nitrogen 30 (H) 6 - 23 mg/dL    Creatinine 1.03 0.50 - 1.30 mg/dL    eGFR 78 >60 mL/min/1.73m*2    Calcium 8.8 8.6 - 10.3 mg/dL      Imaging:  XR chest 1 view    Result Date: 5/6/2024  Interpreted By:  Jaziel Khan, STUDY: XR CHEST 1 VIEW;  5/6/2024 5:28 pm   INDICATION: Signs/Symptoms:s/p rib fracture.   COMPARISON: 5/4/2024   ACCESSION NUMBER(S): [id number]   ORDERING CLINICIAN: JONI SPENCER   FINDINGS: The cardiac silhouette is normal in size. There is asymmetric elevation right diaphragm right basilar opacity correspond to combination of pleural effusion and basilar atelectasis and airspace disease. There is left basilar atelectasis. No pneumothorax. Right rib fractures are better assessed on the recent CT chest examination.       Stable asymmetric elevation of right diaphragm and right basilar opacity compatible with combination of small pleural effusion and basilar atelectasis and airspace  disease. Left basilar subsegmental atelectasis.   Right rib fractures are better assessed on recent CT chest examination.   MACRO: None   Signed by: Jaziel Khan 5/6/2024 9:29 PM Dictation workstation:   [id number]      Assessment  S/P fall-tertiary    On exam there is left chest wall tenderness with palpation.  Breath sounds are diminished in the left lower lung base.  No bruising to chest noted.  I-S 1000.  4 L NC.  No tenderness or pain with palpation to back.  Motor function intact.  Sensation intact.  Patient up ambulating in room and halls.    Plan  -Continue diet as tolerated  -Ortho recommends WBAT  -Consulted social work d/t ETOH history.   -Continue phenobarbital taper  -Continue CIWA  -Pain control  -Nausea control  -DVT prophylaxis-sub q lovnox  -Pulmonary toileting   -Encourage ambulation  -Continue care per primary team   -Recommend when discharging to prescribe lidocaine patches, pain medicine, muscle relaxer and encourage IS use at home. Patient to follow up with pulmonary.       Further recommendations per Dr. Kenyatta Sepulveda, AKIRA-CNP    Time spent  37  minutes obtaining labs, imaging, recommendations, interview, assessment, examination, medication review/ordering, and EMR review.    Plan of care was discussed extensively with patient. Patient verbalized understanding through teach back method. All questions and concerns addressed upon examination.     Of note, this documentation is completed using the Dragon Dictation system (voice recognition software). There may be spelling and/or grammatical errors that were not corrected prior to final submission.

## 2024-05-07 NOTE — DISCHARGE SUMMARY
Discharge Diagnosis  Fall, initial encounter    Issues Requiring Follow-Up  ***    Discharge Meds     Your medication list        START taking these medications        Instructions Last Dose Given Next Dose Due   folic acid 1 mg tablet  Commonly known as: Folvite      Take 1 tablet (1 mg) by mouth once daily.       levoFLOXacin 750 mg tablet  Commonly known as: Levaquin      Take 1 tablet (750 mg) by mouth once daily for 5 days.       lidocaine 4 % patch      Place 1 patch over 12 hours on the skin once daily. Remove & discard patch within 12 hours or as directed by MD.       oxygen gas therapy  Commonly known as: O2      Inhale 1 each continuously.       predniSONE 20 mg tablet  Commonly known as: Deltasone      Take 2 tablets (40 mg) by mouth once daily for 5 days.       thiamine 100 mg tablet  Commonly known as: Vitamin B-1      Take 1 tablet (100 mg) by mouth once daily.       tiotropium 2.5 mcg/actuation inhaler  Commonly known as: Spiriva Respimat  Replaces: tiotropium 18 mcg inhalation capsule      Inhale 2 puffs once daily.              CONTINUE taking these medications        Instructions Last Dose Given Next Dose Due   acetaminophen 325 mg tablet  Commonly known as: Tylenol           albuterol 90 mcg/actuation inhaler           amLODIPine 10 mg tablet  Commonly known as: Norvasc           atorvastatin 40 mg tablet  Commonly known as: Lipitor           hydroCHLOROthiazide 25 mg tablet  Commonly known as: HYDRODiuril           magnesium oxide 420 mg tablet  Commonly known as: Mag-Ox           potassium gluconate 550 mg (90 mg) tablet           STIOLTO RESPIMAT INHL           valsartan 80 mg tablet  Commonly known as: Diovan                  STOP taking these medications      tiotropium 18 mcg inhalation capsule  Commonly known as: Spiriva  Replaced by: tiotropium 2.5 mcg/actuation inhaler                  Where to Get Your Medications        These medications were sent to [id number] - [place],  [place] - [address]  [address], [place] [address]      Phone: [phone]   folic acid 1 mg tablet  levoFLOXacin 750 mg tablet  lidocaine 4 % patch  predniSONE 20 mg tablet  thiamine 100 mg tablet  tiotropium 2.5 mcg/actuation inhaler       Information about where to get these medications is not yet available    Ask your nurse or doctor about these medications  oxygen gas therapy         Test Results Pending At Discharge  Pending Labs       No current pending labs.            Hospital Course   ***    Pertinent Physical Exam At Time of Discharge  Physical Exam    Outpatient Follow-Up  No future appointments.      Princess Callahan MD

## 2024-05-07 NOTE — CARE PLAN
The patient's goals for the shift include      The clinical goals for the shift include maintina spo2 above 92 on 4 liters      Problem: Pain  Goal: My pain/discomfort is manageable  Outcome: Progressing     Problem: Safety  Goal: Patient will be injury free during hospitalization  Outcome: Progressing     Problem: Daily Care  Goal: Daily care needs are met  Outcome: Progressing     Problem: Respiratory  Goal: Clear secretions with interventions this shift  Outcome: Progressing  Goal: Patent airway maintained this shift  Outcome: Progressing

## 2024-05-07 NOTE — PROGRESS NOTES
Elton Lilly is a 73 y.o. male on day 3 of admission presenting with Fall, initial encounter.    Subjective   Feels better     Chief Complaint   Patient presents with    Rib Injury       Pt fell Thursday night and is having L sided rib pain and sob, denies hitting his head, no blood thinners       Recent events were noted.  Patient discharge was put on hold yesterday due to patient requiring 6 L oxygen.  Patient has stable eighth and ninth rib fractures on the left and has a old T12 vertebral plate fracture.  Per Ortho no need for intervention for T12 fracture.  Today's O2 requirement is down to 4 L.    Subjective  PHYSICAL EXAM:  Physical Exam  Vitals reviewed.   Constitutional:       General: He is not in acute distress.  HENT:      Head: Normocephalic.      Nose: Nose normal.      Mouth/Throat:      Mouth: Mucous membranes are moist.   Cardiovascular:      Rate and Rhythm: Normal rate.   Pulmonary:      Effort: Pulmonary effort is normal.      Breath sounds: Examination of the left-lower field reveals decreased breath sounds. Decreased breath sounds present.   Chest:      Chest wall: Tenderness (Left) present.   Abdominal:      General: Abdomen is flat. Bowel sounds are normal.      Palpations: Abdomen is soft.   Skin:     General: Skin is warm.      Capillary Refill: Capillary refill takes less than 2 seconds.   Neurological:      General: No focal deficit present.      Mental Status: He is alert and oriented to person, place, and time.   Psychiatric:         Mood and Affect: Mood normal.         Vital signs in last 24 hours:      Vitals:     05/07/24 0738   BP: 146/83   Pulse: 71   Resp: 17   Temp: 36.7 °C (98.1 °F)   SpO2: 95%      Intake/Output this shift:     Intake/Output Summary (Last 24 hours) at 5/7/2024 0753  Last data filed at 5/6/2024 0900      Gross per 24 hour   Intake 590 ml   Output --   Net 590 ml      Allergies:        Allergies   Allergen Reactions    Bee Venom Protein (Honey Bee) Swelling        Bee Stings      Medications:  Scheduled medications  acetylcysteine, 3 mL, nebulization, 4x daily  amLODIPine, 10 mg, oral, Daily  atorvastatin, 40 mg, oral, Nightly  enoxaparin, 40 mg, subcutaneous, q24h  famotidine, 20 mg, oral, BID   Or  famotidine, 20 mg, intravenous, BID  folic acid, 1 mg, oral, Daily  ipratropium-albuteroL, 3 mL, nebulization, 4x daily  levoFLOXacin, 750 mg, intravenous, q24h  lidocaine, 1 patch, transdermal, Daily  magnesium oxide, 400 mg, oral, q24h  methylPREDNISolone sodium succinate (PF), 40 mg, intravenous, q12h  multivitamin with minerals, 1 tablet, oral, Daily  nicotine, 1 patch, transdermal, Daily  oxygen, , inhalation, Continuous - Inhalation  PHENobarbitaL, 64.8 mg, oral, TID   Followed by  [START ON 5/8/2024] PHENobarbitaL, 32.4 mg, oral, TID  thiamine, 100 mg, oral, Daily  tiotropium, 2 puff, inhalation, Daily  valsartan, 80 mg, oral, Daily        Continuous medications  oxygen, , Last Rate: 5 L/min (05/04/24 0902)        PRN medications  PRN medications: acetaminophen **OR** acetaminophen **OR** acetaminophen, acetaminophen **OR** acetaminophen **OR** acetaminophen, ipratropium-albuteroL, LORazepam **OR** LORazepam **OR** LORazepam, melatonin, ondansetron **OR** ondansetron, oxyCODONE, polyethylene glycol  Labs:        Results for orders placed or performed during the hospital encounter of 05/04/24 (from the past 24 hour(s))   CBC   Result Value Ref Range     WBC 18.2 (H) 4.4 - 11.3 x10*3/uL     nRBC 0.0 0.0 - 0.0 /100 WBCs     RBC 4.91 4.50 - 5.90 x10*6/uL     Hemoglobin 14.6 13.5 - 17.5 g/dL     Hematocrit 43.1 41.0 - 52.0 %     MCV 88 80 - 100 fL     MCH 29.7 26.0 - 34.0 pg     MCHC 33.9 32.0 - 36.0 g/dL     RDW 14.2 11.5 - 14.5 %     Platelets 425 150 - 450 x10*3/uL   Basic Metabolic Panel   Result Value Ref Range     Glucose 184 (H) 74 - 99 mg/dL     Sodium 135 (L) 136 - 145 mmol/L     Potassium 4.1 3.5 - 5.3 mmol/L     Chloride 98 98 - 107 mmol/L     Bicarbonate 30 21 - 32  mmol/L     Anion Gap 11 10 - 20 mmol/L     Urea Nitrogen 28 (H) 6 - 23 mg/dL     Creatinine 1.05 0.50 - 1.30 mg/dL     eGFR 76 >60 mL/min/1.73m*2     Calcium 9.2 8.6 - 10.3 mg/dL   SST TOP   Result Value Ref Range     Extra Tube Hold for add-ons.     SST TOP   Result Value Ref Range     Extra Tube Hold for add-ons.     CBC   Result Value Ref Range     WBC 14.4 (H) 4.4 - 11.3 x10*3/uL     nRBC 0.0 0.0 - 0.0 /100 WBCs     RBC 4.69 4.50 - 5.90 x10*6/uL     Hemoglobin 13.8 13.5 - 17.5 g/dL     Hematocrit 40.8 (L) 41.0 - 52.0 %     MCV 87 80 - 100 fL     MCH 29.4 26.0 - 34.0 pg     MCHC 33.8 32.0 - 36.0 g/dL     RDW 14.3 11.5 - 14.5 %     Platelets 374 150 - 450 x10*3/uL   Basic Metabolic Panel   Result Value Ref Range     Glucose 136 (H) 74 - 99 mg/dL     Sodium 136 136 - 145 mmol/L     Potassium 4.2 3.5 - 5.3 mmol/L     Chloride 102 98 - 107 mmol/L     Bicarbonate 29 21 - 32 mmol/L     Anion Gap 9 (L) 10 - 20 mmol/L     Urea Nitrogen 30 (H) 6 - 23 mg/dL     Creatinine 1.03 0.50 - 1.30 mg/dL     eGFR 78 >60 mL/min/1.73m*2     Calcium 8.8 8.6 - 10.3 mg/dL      Imaging:  XR chest 1 view     Result Date: 5/6/2024  Interpreted By:  Jaziel Khan, STUDY: XR CHEST 1 VIEW;  5/6/2024 5:28 pm   INDICATION: Signs/Symptoms:s/p rib fracture.   COMPARISON: 5/4/2024   ACCESSION NUMBER(S): [id number]   ORDERING CLINICIAN: JONI SPENCER   FINDINGS: The cardiac silhouette is normal in size. There is asymmetric elevation right diaphragm right basilar opacity correspond to combination of pleural effusion and basilar atelectasis and airspace disease. There is left basilar atelectasis. No pneumothorax. Right rib fractures are better assessed on the recent CT chest examination.        Stable asymmetric elevation of right diaphragm and right basilar opacity compatible with combination of small pleural effusion and basilar atelectasis and airspace disease. Left basilar subsegmental atelectasis.   Right rib fractures are better assessed on  recent CT chest examination.   MACRO: None   Signed by: Jaziel Khan 5/6/2024 9:29 PM Dictation workstation:   [id number]       Assessment  S/P fall-tertiary  #Acute on chronic hypoxic respiratory failure  #COPD with acute exacerbation  #Left eighth and ninth rib fracture  #Right lower lung atelectasis collapse mucous plug  #Status post fall     Continue oxygen supplement per nasal cannula  Titrate down as tolerated  Incentive spirometry  Continue Solu-Medrol DuoNeb nebulization  Continue antibiotics  Mucomyst with nebulization  Chest therapy     #History of CAD  #History of hyperlipidemia and hypertension  Continue home medications     #Daily alcohol use  #Marijuana use  #Current active smoker  Nicotine patch  CIWA protocol  Ativan per CIWA protocol  Thiamine and folate       DVT prophylaxis on Lovenox     Pulmonary comments:- Even though patient is hypoxia is worse than baseline, this is likely due to chest restriction from rib fracture as well as significant atelectasis of right lower lobe.  There is no mild  COPD exacerbation, pneumonia, CHF or other acute problems to keep patient in the Hospital. For rib fractures, only thing is needed is pain control, incentive spirometry, muscle relaxant and encourage plenty spirometry. .Okay to discharge home on increased oxygen.  Thank you for the referral     Dwight Sawant MD

## 2024-05-07 NOTE — PROGRESS NOTES
05/07/24 1551   Discharge Planning   Living Arrangements Alone   Support Systems Friends/neighbors;Family members   Assistance Needed none   Type of Residence Private residence   Home or Post Acute Services None   Patient expects to be discharged to: home   Patient Choice   Provider Choice list and CMS website (https://medicare.gov/care-compare#search) for post-acute Quality and Resource Measure Data were provided and reviewed with: Patient   Patient / Family choosing to utilize agency / facility established prior to hospitalization No     Patient is from home, plan is to return home, no needs identified. Patient discharging home.

## 2025-04-06 ENCOUNTER — APPOINTMENT (OUTPATIENT)
Dept: RADIOLOGY | Facility: HOSPITAL | Age: 75
DRG: 092 | End: 2025-04-06
Payer: MEDICARE

## 2025-04-06 ENCOUNTER — APPOINTMENT (OUTPATIENT)
Dept: CARDIOLOGY | Facility: HOSPITAL | Age: 75
DRG: 092 | End: 2025-04-06
Payer: MEDICARE

## 2025-04-06 ENCOUNTER — HOSPITAL ENCOUNTER (INPATIENT)
Facility: HOSPITAL | Age: 75
End: 2025-04-06
Attending: EMERGENCY MEDICINE | Admitting: INTERNAL MEDICINE
Payer: MEDICARE

## 2025-04-06 DIAGNOSIS — R47.01 EXPRESSIVE APHASIA: Primary | ICD-10-CM

## 2025-04-06 DIAGNOSIS — R56.9 SEIZURE (MULTI): ICD-10-CM

## 2025-04-06 DIAGNOSIS — E87.1 HYPONATREMIA: ICD-10-CM

## 2025-04-06 LAB
ALBUMIN SERPL BCP-MCNC: 4.3 G/DL (ref 3.4–5)
ALBUMIN SERPL BCP-MCNC: 5.1 G/DL (ref 3.4–5)
ALP SERPL-CCNC: 112 U/L (ref 33–136)
ALP SERPL-CCNC: 139 U/L (ref 33–136)
ALT SERPL W P-5'-P-CCNC: 20 U/L (ref 10–52)
ALT SERPL W P-5'-P-CCNC: 24 U/L (ref 10–52)
ANION GAP SERPL CALC-SCNC: 24 MMOL/L (ref 10–20)
AST SERPL W P-5'-P-CCNC: 20 U/L (ref 9–39)
AST SERPL W P-5'-P-CCNC: 26 U/L (ref 9–39)
ATRIAL RATE: 104 BPM
ATRIAL RATE: 93 BPM
BASE EXCESS BLDV CALC-SCNC: 9.5 MMOL/L (ref -2–3)
BASOPHILS # BLD AUTO: 0.07 X10*3/UL (ref 0–0.1)
BASOPHILS NFR BLD AUTO: 0.7 %
BILIRUB DIRECT SERPL-MCNC: 0.1 MG/DL (ref 0–0.3)
BILIRUB SERPL-MCNC: 0.7 MG/DL (ref 0–1.2)
BILIRUB SERPL-MCNC: 0.8 MG/DL (ref 0–1.2)
BNP SERPL-MCNC: 55 PG/ML (ref 0–99)
BODY TEMPERATURE: ABNORMAL
BUN SERPL-MCNC: 15 MG/DL (ref 6–23)
CALCIUM SERPL-MCNC: 11.1 MG/DL (ref 8.6–10.3)
CARDIAC TROPONIN I PNL SERPL HS: 7 NG/L (ref 0–20)
CARDIAC TROPONIN I PNL SERPL HS: 8 NG/L (ref 0–20)
CHLORIDE SERPL-SCNC: 86 MMOL/L (ref 98–107)
CHOLEST SERPL-MCNC: 166 MG/DL (ref 0–199)
CHOLESTEROL/HDL RATIO: 2.1
CO2 SERPL-SCNC: 21 MMOL/L (ref 21–32)
CREAT SERPL-MCNC: 1.24 MG/DL (ref 0.5–1.3)
EGFRCR SERPLBLD CKD-EPI 2021: 62 ML/MIN/1.73M*2
EOSINOPHIL # BLD AUTO: 0.13 X10*3/UL (ref 0–0.4)
EOSINOPHIL NFR BLD AUTO: 1.2 %
ERYTHROCYTE [DISTWIDTH] IN BLOOD BY AUTOMATED COUNT: 13.2 % (ref 11.5–14.5)
EST. AVERAGE GLUCOSE BLD GHB EST-MCNC: 97 MG/DL
ETHANOL SERPL-MCNC: <10 MG/DL
GLUCOSE BLD MANUAL STRIP-MCNC: 166 MG/DL (ref 74–99)
GLUCOSE SERPL-MCNC: 155 MG/DL (ref 74–99)
HBA1C MFR BLD: 5 %
HCO3 BLDV-SCNC: 33.5 MMOL/L (ref 22–26)
HCT VFR BLD AUTO: 48.9 % (ref 41–52)
HDLC SERPL-MCNC: 80.3 MG/DL
HGB BLD-MCNC: 17.3 G/DL (ref 13.5–17.5)
HOLD SPECIMEN: NORMAL
IMM GRANULOCYTES # BLD AUTO: 0.04 X10*3/UL (ref 0–0.5)
IMM GRANULOCYTES NFR BLD AUTO: 0.4 % (ref 0–0.9)
INHALED O2 CONCENTRATION: 21 %
LDLC SERPL CALC-MCNC: 73 MG/DL
LYMPHOCYTES # BLD AUTO: 0.84 X10*3/UL (ref 0.8–3)
LYMPHOCYTES NFR BLD AUTO: 8 %
MAGNESIUM SERPL-MCNC: 2.52 MG/DL (ref 1.6–2.4)
MCH RBC QN AUTO: 29.9 PG (ref 26–34)
MCHC RBC AUTO-ENTMCNC: 35.4 G/DL (ref 32–36)
MCV RBC AUTO: 85 FL (ref 80–100)
MONOCYTES # BLD AUTO: 1.26 X10*3/UL (ref 0.05–0.8)
MONOCYTES NFR BLD AUTO: 12 %
NEUTROPHILS # BLD AUTO: 8.13 X10*3/UL (ref 1.6–5.5)
NEUTROPHILS NFR BLD AUTO: 77.7 %
NON HDL CHOLESTEROL: 86 MG/DL (ref 0–149)
NRBC BLD-RTO: 0 /100 WBCS (ref 0–0)
OXYHGB MFR BLDV: 78.8 % (ref 45–75)
P AXIS: 59 DEGREES
P AXIS: 78 DEGREES
P OFFSET: 205 MS
P OFFSET: 207 MS
P ONSET: 137 MS
P ONSET: 139 MS
PCO2 BLDV: 42 MM HG (ref 41–51)
PH BLDV: 7.51 PH (ref 7.33–7.43)
PLATELET # BLD AUTO: 393 X10*3/UL (ref 150–450)
PO2 BLDV: 47 MM HG (ref 35–45)
POTASSIUM SERPL-SCNC: 3.6 MMOL/L (ref 3.5–5.3)
PR INTERVAL: 168 MS
PR INTERVAL: 170 MS
PROT SERPL-MCNC: 6.9 G/DL (ref 6.4–8.2)
PROT SERPL-MCNC: 8.4 G/DL (ref 6.4–8.2)
Q ONSET: 222 MS
Q ONSET: 223 MS
QRS COUNT: 15 BEATS
QRS COUNT: 18 BEATS
QRS DURATION: 86 MS
QRS DURATION: 90 MS
QT INTERVAL: 328 MS
QT INTERVAL: 358 MS
QTC CALCULATION(BAZETT): 431 MS
QTC CALCULATION(BAZETT): 445 MS
QTC FREDERICIA: 394 MS
QTC FREDERICIA: 414 MS
R AXIS: 77 DEGREES
R AXIS: 90 DEGREES
RBC # BLD AUTO: 5.79 X10*6/UL (ref 4.5–5.9)
SAO2 % BLDV: 82 % (ref 45–75)
SODIUM SERPL-SCNC: 127 MMOL/L (ref 136–145)
T AXIS: 70 DEGREES
T AXIS: 81 DEGREES
T OFFSET: 387 MS
T OFFSET: 401 MS
TRIGL SERPL-MCNC: 63 MG/DL (ref 0–149)
VENTRICULAR RATE: 104 BPM
VENTRICULAR RATE: 93 BPM
VLDL: 13 MG/DL (ref 0–40)
WBC # BLD AUTO: 10.5 X10*3/UL (ref 4.4–11.3)

## 2025-04-06 PROCEDURE — 2500000001 HC RX 250 WO HCPCS SELF ADMINISTERED DRUGS (ALT 637 FOR MEDICARE OP): Performed by: NURSE PRACTITIONER

## 2025-04-06 PROCEDURE — 99285 EMERGENCY DEPT VISIT HI MDM: CPT | Performed by: EMERGENCY MEDICINE

## 2025-04-06 PROCEDURE — 82805 BLOOD GASES W/O2 SATURATION: CPT | Performed by: NURSE PRACTITIONER

## 2025-04-06 PROCEDURE — 82947 ASSAY GLUCOSE BLOOD QUANT: CPT

## 2025-04-06 PROCEDURE — 84484 ASSAY OF TROPONIN QUANT: CPT | Performed by: EMERGENCY MEDICINE

## 2025-04-06 PROCEDURE — 70498 CT ANGIOGRAPHY NECK: CPT

## 2025-04-06 PROCEDURE — 70498 CT ANGIOGRAPHY NECK: CPT | Performed by: RADIOLOGY

## 2025-04-06 PROCEDURE — 93005 ELECTROCARDIOGRAM TRACING: CPT

## 2025-04-06 PROCEDURE — 36415 COLL VENOUS BLD VENIPUNCTURE: CPT | Performed by: EMERGENCY MEDICINE

## 2025-04-06 PROCEDURE — 80061 LIPID PANEL: CPT | Performed by: EMERGENCY MEDICINE

## 2025-04-06 PROCEDURE — 85025 COMPLETE CBC W/AUTO DIFF WBC: CPT | Performed by: EMERGENCY MEDICINE

## 2025-04-06 PROCEDURE — 83036 HEMOGLOBIN GLYCOSYLATED A1C: CPT | Mod: ELYLAB | Performed by: EMERGENCY MEDICINE

## 2025-04-06 PROCEDURE — 70496 CT ANGIOGRAPHY HEAD: CPT | Performed by: RADIOLOGY

## 2025-04-06 PROCEDURE — 2550000001 HC RX 255 CONTRASTS: Performed by: EMERGENCY MEDICINE

## 2025-04-06 PROCEDURE — 82077 ASSAY SPEC XCP UR&BREATH IA: CPT | Performed by: NURSE PRACTITIONER

## 2025-04-06 PROCEDURE — 80076 HEPATIC FUNCTION PANEL: CPT | Mod: CCI | Performed by: EMERGENCY MEDICINE

## 2025-04-06 PROCEDURE — 80053 COMPREHEN METABOLIC PANEL: CPT | Performed by: EMERGENCY MEDICINE

## 2025-04-06 PROCEDURE — 1200000002 HC GENERAL ROOM WITH TELEMETRY DAILY

## 2025-04-06 PROCEDURE — 83735 ASSAY OF MAGNESIUM: CPT | Performed by: EMERGENCY MEDICINE

## 2025-04-06 PROCEDURE — 2500000001 HC RX 250 WO HCPCS SELF ADMINISTERED DRUGS (ALT 637 FOR MEDICARE OP): Performed by: EMERGENCY MEDICINE

## 2025-04-06 PROCEDURE — 2500000004 HC RX 250 GENERAL PHARMACY W/ HCPCS (ALT 636 FOR OP/ED): Performed by: NURSE PRACTITIONER

## 2025-04-06 PROCEDURE — 83880 ASSAY OF NATRIURETIC PEPTIDE: CPT | Performed by: EMERGENCY MEDICINE

## 2025-04-06 PROCEDURE — 82810 BLOOD GASES O2 SAT ONLY: CPT | Performed by: NURSE PRACTITIONER

## 2025-04-06 PROCEDURE — 99222 1ST HOSP IP/OBS MODERATE 55: CPT | Performed by: NURSE PRACTITIONER

## 2025-04-06 PROCEDURE — 36415 COLL VENOUS BLD VENIPUNCTURE: CPT | Performed by: NURSE PRACTITIONER

## 2025-04-06 RX ORDER — PHENOBARBITAL 32.4 MG/1
65 TABLET ORAL EVERY 6 HOURS PRN
Status: ACTIVE | OUTPATIENT
Start: 2025-04-06

## 2025-04-06 RX ORDER — LANOLIN ALCOHOL/MO/W.PET/CERES
100 CREAM (GRAM) TOPICAL DAILY
Status: ACTIVE | OUTPATIENT
Start: 2025-04-09

## 2025-04-06 RX ORDER — ASPIRIN 325 MG
325 TABLET ORAL ONCE
Status: COMPLETED | OUTPATIENT
Start: 2025-04-06 | End: 2025-04-06

## 2025-04-06 RX ORDER — THIAMINE HYDROCHLORIDE 100 MG/ML
100 INJECTION, SOLUTION INTRAMUSCULAR; INTRAVENOUS DAILY
Status: DISPENSED | OUTPATIENT
Start: 2025-04-06 | End: 2025-04-09

## 2025-04-06 RX ORDER — NAPROXEN SODIUM 220 MG/1
81 TABLET, FILM COATED ORAL DAILY
Status: ACTIVE | OUTPATIENT
Start: 2025-04-06

## 2025-04-06 RX ORDER — FOLIC ACID 1 MG/1
1 TABLET ORAL DAILY
Status: DISPENSED | OUTPATIENT
Start: 2025-04-06

## 2025-04-06 RX ORDER — MULTIVIT-MIN/IRON FUM/FOLIC AC 7.5 MG-4
1 TABLET ORAL DAILY
Status: DISPENSED | OUTPATIENT
Start: 2025-04-06

## 2025-04-06 RX ORDER — HEPARIN SODIUM 5000 [USP'U]/ML
5000 INJECTION, SOLUTION INTRAVENOUS; SUBCUTANEOUS EVERY 8 HOURS SCHEDULED
Status: DISPENSED | OUTPATIENT
Start: 2025-04-06

## 2025-04-06 RX ORDER — FAMOTIDINE 10 MG/ML
20 INJECTION, SOLUTION INTRAVENOUS ONCE
Status: COMPLETED | OUTPATIENT
Start: 2025-04-06 | End: 2025-04-06

## 2025-04-06 RX ORDER — ATORVASTATIN CALCIUM 20 MG/1
40 TABLET, FILM COATED ORAL NIGHTLY
Status: DISPENSED | OUTPATIENT
Start: 2025-04-06

## 2025-04-06 RX ORDER — PHENOBARBITAL 32.4 MG/1
32.4 TABLET ORAL 3 TIMES DAILY
Status: ACTIVE | OUTPATIENT
Start: 2025-04-08 | End: 2025-04-10

## 2025-04-06 RX ORDER — PHENOBARBITAL 32.4 MG/1
64.8 TABLET ORAL 3 TIMES DAILY
Status: DISPENSED | OUTPATIENT
Start: 2025-04-06 | End: 2025-04-08

## 2025-04-06 RX ORDER — SODIUM CHLORIDE 9 MG/ML
100 INJECTION, SOLUTION INTRAVENOUS CONTINUOUS
Status: ACTIVE | OUTPATIENT
Start: 2025-04-06 | End: 2025-04-07

## 2025-04-06 RX ADMIN — FAMOTIDINE 20 MG: 10 INJECTION, SOLUTION INTRAVENOUS at 20:29

## 2025-04-06 RX ADMIN — FOLIC ACID 1 MG: 1 TABLET ORAL at 20:29

## 2025-04-06 RX ADMIN — PHENOBARBITAL 64.8 MG: 32.4 TABLET ORAL at 20:29

## 2025-04-06 RX ADMIN — SODIUM CHLORIDE 1000 ML: 0.9 INJECTION, SOLUTION INTRAVENOUS at 20:28

## 2025-04-06 RX ADMIN — THIAMINE HYDROCHLORIDE 100 MG: 100 INJECTION, SOLUTION INTRAMUSCULAR; INTRAVENOUS at 20:29

## 2025-04-06 RX ADMIN — IOHEXOL 75 ML: 350 INJECTION, SOLUTION INTRAVENOUS at 16:48

## 2025-04-06 RX ADMIN — SODIUM CHLORIDE 100 ML/HR: 0.9 INJECTION, SOLUTION INTRAVENOUS at 23:48

## 2025-04-06 RX ADMIN — ASPIRIN 325 MG: 325 TABLET ORAL at 20:29

## 2025-04-06 RX ADMIN — ATORVASTATIN CALCIUM 40 MG: 20 TABLET, FILM COATED ORAL at 20:29

## 2025-04-06 RX ADMIN — Medication 1 TABLET: at 20:29

## 2025-04-06 RX ADMIN — HEPARIN SODIUM 5000 UNITS: 5000 INJECTION, SOLUTION INTRAVENOUS; SUBCUTANEOUS at 23:47

## 2025-04-06 ASSESSMENT — LIFESTYLE VARIABLES
VISUAL DISTURBANCES: NOT PRESENT
HAVE PEOPLE ANNOYED YOU BY CRITICIZING YOUR DRINKING: NO
EVER HAD A DRINK FIRST THING IN THE MORNING TO STEADY YOUR NERVES TO GET RID OF A HANGOVER: NO
ORIENTATION AND CLOUDING OF SENSORIUM: ORIENTED AND CAN DO SERIAL ADDITIONS
ANXIETY: NO ANXIETY, AT EASE
TOTAL SCORE: 2
TOTAL SCORE: 2
AGITATION: NORMAL ACTIVITY
AGITATION: NORMAL ACTIVITY
VISUAL DISTURBANCES: NOT PRESENT
TOTAL SCORE: 0
TREMOR: 2
AUDITORY DISTURBANCES: NOT PRESENT
HEADACHE, FULLNESS IN HEAD: NOT PRESENT
PULSE: 67
PAROXYSMAL SWEATS: NO SWEAT VISIBLE
NAUSEA AND VOMITING: NO NAUSEA AND NO VOMITING
ORIENTATION AND CLOUDING OF SENSORIUM: ORIENTED AND CAN DO SERIAL ADDITIONS
ANXIETY: NO ANXIETY, AT EASE
TREMOR: 2
PAROXYSMAL SWEATS: NO SWEAT VISIBLE
AUDITORY DISTURBANCES: NOT PRESENT
EVER FELT BAD OR GUILTY ABOUT YOUR DRINKING: NO
HAVE YOU EVER FELT YOU SHOULD CUT DOWN ON YOUR DRINKING: NO
NAUSEA AND VOMITING: NO NAUSEA AND NO VOMITING
BLOOD PRESSURE: 121/68
HEADACHE, FULLNESS IN HEAD: NOT PRESENT

## 2025-04-06 ASSESSMENT — ENCOUNTER SYMPTOMS
FLANK PAIN: 0
HEMATURIA: 0
DYSURIA: 0
CHILLS: 0
VOMITING: 0
FREQUENCY: 0
SHORTNESS OF BREATH: 0
SPEECH DIFFICULTY: 1
CONSTIPATION: 0
CONFUSION: 1
ABDOMINAL PAIN: 0
PALPITATIONS: 0
DIARRHEA: 0
NAUSEA: 0
FEVER: 0

## 2025-04-06 ASSESSMENT — PAIN - FUNCTIONAL ASSESSMENT: PAIN_FUNCTIONAL_ASSESSMENT: CPOT (CRITICAL CARE PAIN OBSERVATION TOOL)

## 2025-04-06 NOTE — H&P
"History Of Present Illness  Elton Lilly is a 74 y.o. male with a past medical history of HTN, COPD, seizure disorder (2/2 remote ICH per son), and EtOH dependence who presented to the ED for confusion. The patient is unable to recall the full events, and history is provided by his son present at bedside. His son reports that the patient had been sitting in his car for around 20 minutes, not speaking or reacting. He notes the patient became decorticate with both arms during this time. He reports 3 previous episodes in the last 4 years similar. He also reports \"you can tell when he needs to drink\", however the patient denies any previous withdrawal symptoms. The patient on exam has returned to baseline and denies any fever, chills, chest pain, shortness of breath, nausea, or vomiting. He does endorse some dyspepsia which has been ongoing. He endorses smoking, denies illicit substance use. He reports drinking 3-5 beers daily, last drink yesterday.      Past Medical History  Past Medical History:   Diagnosis Date    COPD (chronic obstructive pulmonary disease) (Multi)     Hypertension        Surgical History  No past surgical history on file.     Social History  He reports that he has been smoking cigarettes. He has a 55 pack-year smoking history. He has never used smokeless tobacco. Alcohol use questions deferred to the physician. He reports current drug use. Drug: Marijuana.    Family History  No family history on file.     Allergies  Bee venom protein (honey bee)    Review of Systems   Constitutional:  Negative for chills and fever.   Respiratory:  Negative for shortness of breath.    Cardiovascular:  Negative for chest pain and palpitations.   Gastrointestinal:  Negative for abdominal pain, constipation, diarrhea, nausea and vomiting.   Genitourinary:  Negative for dysuria, flank pain, frequency, hematuria and urgency.   Neurological:  Positive for speech difficulty.   Psychiatric/Behavioral:  Positive for " "confusion.    All other systems reviewed and are negative.       Physical Exam  Vitals reviewed.   HENT:      Head: Normocephalic and atraumatic.   Cardiovascular:      Rate and Rhythm: Normal rate and regular rhythm.      Heart sounds: Normal heart sounds.   Pulmonary:      Effort: Pulmonary effort is normal.      Breath sounds: Normal air entry.   Abdominal:      General: Bowel sounds are normal.      Palpations: Abdomen is soft.      Tenderness: There is no abdominal tenderness.   Musculoskeletal:         General: No deformity.   Skin:     General: Skin is warm and dry.   Neurological:      General: No focal deficit present.      Mental Status: He is alert and oriented to person, place, and time.      Motor: Tremor present.   Psychiatric:         Mood and Affect: Mood normal.         Behavior: Behavior normal.          Last Recorded Vitals  Blood pressure 128/81, pulse 98, temperature 36.6 °C (97.9 °F), temperature source Temporal, resp. rate 18, height 1.626 m (5' 4\"), weight 72.6 kg (160 lb), SpO2 (!) 91%.    Relevant Results      Lab Results   Component Value Date    WBC 10.5 04/06/2025    HGB 17.3 04/06/2025    HCT 48.9 04/06/2025    MCV 85 04/06/2025     04/06/2025     Lab Results   Component Value Date    GLUCOSE 155 (H) 04/06/2025    CALCIUM 11.1 (H) 04/06/2025     (L) 04/06/2025    K 3.6 04/06/2025    CO2 21 04/06/2025    CL 86 (L) 04/06/2025    BUN 15 04/06/2025    CREATININE 1.24 04/06/2025     ECG 12 lead  Normal sinus rhythm  Septal infarct (cited on or before 06-APR-2025)  Abnormal ECG  When compared with ECG of 06-APR-2025 16:12, (unconfirmed)  No significant change was found  CT angio head and neck w and wo IV contrast  Narrative: Interpreted By:  Daly Sampson,   STUDY:  CT ANGIO HEAD AND NECK W AND WO IV CONTRAST;  4/6/2025 4:56 pm      INDICATION:  Signs/Symptoms:expressive aphasia.          COMPARISON:  None.      ACCESSION NUMBER(S):  [id number]      ORDERING " CLINICIAN:  CLAY RODRIGUEZ      TECHNIQUE:  Unenhanced CT images of the head were obtained. Subsequently, 75 ML  of Omnipaque 350 was administered intravenously and axial images of  the head and neck were acquired.  Coronal, sagittal, and 3-D  reconstructions were provided for review.      FINDINGS:  CT head:      There is no acute intracranial hemorrhage, mass effect or extra-axial  fluid collection. Prominent bifrontal extra-axial CSF space likely  represents asymmetric volume loss.      Gray-white differentiation is maintained. Patchy nonspecific white  matter hypodensity is compatible with microangiopathy.      Ventricles, cortical sulci and basal cisterns are prominent  compatible with mild-to-moderate volume loss.      Evaluation of the calvarium is unremarkable. Mucous retention cyst  versus polyp within the right maxillary sinus. Paranasal sinuses are  otherwise predominantly clear. Debris within the external auditory  canals likely represents cerumen.      CTA HEAD FINDINGS:      Anterior circulation: The bilateral intracranial internal carotid  arteries, bilateral carotid terminals, bilateral proximal anterior  and middle cerebral arteries are patent. There is atherosclerotic  calcification along the cavernous segments of the carotid arteries.      Posterior circulation: Bilateral intracranial vertebral arteries,  vertebrobasilar junction, basilar artery and proximal posterior  cerebral arteries are patent. The right intradural vertebral artery  is dominant. The non dominant left vertebral artery is diminutive.  There is decreased caliber of the left intradural vertebral artery  following takeoff of PICA. The right posterior cerebral artery is  predominantly supplied by robust posterior communicating artery.      CTA NECK FINDINGS:      Atherosclerotic calcification along the aortic arch. Mild  atherosclerotic calcification along the origins of the great vessels  without significant stenosis.      Right  carotid vessels: The common carotid artery is patent. Mild  atherosclerotic calcification along the common carotid artery.  Predominantly calcified plaque at the carotid bifurcation and along  the proximal cervical internal carotid artery without stenosis by  NASCET criteria. The internal carotid artery in the neck is patent  without stenosis.      Left carotid vessels: The common carotid artery is patent. The  carotid bifurcation is patent without stenosis. The internal carotid  artery in the neck is patent without stenosis.      Vertebral vessels:  The visualized segments of the cervical vertebral  arteries are patent. The right cervical vertebral artery is dominant.  The non dominant left cervical vertebral artery is diminutive.      Limited images through the lung apices demonstrates emphysematous  changes. There are degenerative changes of the cervical spine without  evidence of high-grade spinal canal stenosis. Multilevel moderate to  severe neural foraminal narrowing.      Impression: CTA neck      No evidence for significant stenosis of the cervical vessels.  Atherosclerotic calcification at the right carotid bifurcation  without stenosis by NASCET criteria.      CTA head:      No evidence for significant stenosis or large branch vessel cutoffs  of the intracranial vessels.      CT head:      No acute intracranial hemorrhage or mass effect.      Volume loss. Nonspecific white matter hypodensity compatible with  microangiopathy.      MACRO:  None      Signed by: Daly Sampson 4/6/2025 5:16 PM  Dictation workstation:   [id number]  ECG 12 lead  Sinus tachycardia  Rightward axis  Septal infarct , age undetermined  Abnormal ECG  When compared with ECG of 04-MAY-2024 08:19,  Premature ventricular complexes are no longer Present    ED Medication Administration from 04/06/2025 1552 to 04/06/2025 1904         Date/Time Order Dose Route Action Action by     04/06/2025 1648 EDT iohexol (OMNIPaque) 350 mg iodine/mL  solution 75 mL 75 mL intravenous Given CLAUDIA Bernstein               Assessment/Plan   Assessment & Plan  Expressive aphasia      #Expressive aphasia  #Likely seizure activity  -CTH -ve for acute process  -History highly suggestive of seizure activity, doubt TIA/CVA  -Consult neurology  -Will hold off on TIA/CVA workup for now  -UA, UDS    #Hyponatremia  #Hypercalcemia  #Suspected AGMA  #Abnormal LFTs  -Suspect 2/2 EtOH dependence and likely lactic acidosis 2/2 presumed seizure activity  -No hypoglycemic, doubt EtOH KA  -Will bolus with NS, then continue @ 100 mL/hr  -Check VBG, EtOH    #EtOH dependence  -Phenobarb taper  -IV thiamine    #COPD  -No acute issues    #HTN  -Home meds             Brett Rankin, APRN-CNP

## 2025-04-06 NOTE — ED PROVIDER NOTES
HPI   Chief Complaint   Patient presents with    Altered Mental Status     Patient was found in his car by caregiver confused. Patient unable to answer questions. Unknown LKW.       HPI: 74-year-old male brought in by EMS for confusion.  History is per EMS as the patient was unable to provide any history.  Patient is having what appears to be an expressive aphasia.  EMS states they were called by a family member.  Last known well is unknown.  It appears in looking at old records that the patient has a history of COPD is not home O2 dependent is a smoker and also has a history of hypertension.  Anticoagulant use is unknown    Family HX: Denies any significant/pertinent family history.  Social Hx: Denies ETOH or drug use.  Review of systems:  Gen.: No weight loss, fatigue, anorexia, insomnia, fever.   Eyes: No vision loss, double vision, drainage, eye pain.   ENT: No pharyngitis, dry mouth.   Cardiac: No chest pain, palpitations, syncope, near syncope.   Pulmonary: No shortness of breath, cough, hemoptysis.   Heme/lymph: No swollen glands, fever, bleeding.   GI: No abdominal pain, change in bowel habits, melena, hematemesis, hematochezia, nausea, vomiting, diarrhea.   : No discharge, dysuria, frequency, urgency, hematuria.   Musculoskeletal: No limb pain, joint pain, joint swelling.   Skin: No rashes.   Psych: No depression, anxiety, suicidality, homicidality.   Review of systems is otherwise negative unless stated above or in history of present illness.    Physical Exam:    Appearance: Alert, oriented , cooperative,  in no acute distress. Well nourished & well hydrated.    Skin: Intact,  dry skin, no lesions, rash, petechiae or purpura.     Eyes: PERRLA, EOMs intact,  Conjunctiva pink with no redness or exudates. Eyelids without lesions. No scleral icterus.     ENT: Hearing grossly intact. External auditory canals patent, tympanic membranes intact with visible landmarks. Nares patent, mucus membranes moist.  Dentition without lesions. Pharynx clear, uvula midline.     Neck: Supple, without meningismus. Thyroid not palpable. Trachea at midline. No lymphadenopathy.    Pulmonary: Clear bilaterally with good chest wall excursion. No rales, rhonchi or wheezing. No accessory muscle use or stridor.    Cardiac: Normal S1, S2 without murmur, rub, gallop or extrasystole. No JVD, Carotids without bruits.    Abdomen: Soft, nontender, active bowel sounds.  No palpable organomegaly.  No rebound or guarding.  No CVA tenderness.    Genitourinary: Exam deferred.    Musculoskeletal: Full range of motion. no pain, edema, or deformity. Pulses full and equal. No cyanosis, clubbing, or edema.    Neurological:  Cranial nerves II through XII are grossly intact, finger-nose touch is normal, normal sensation, no weakness, no focal findings identified.    Psychiatric: Appropriate mood and affect.     Medical Decision-Making:  Testing: EG was obtained which is interpreted by me shows a sinus tach rate of 104 without evidence of obvious ST elevations or T wave inversions indicate acute ischemia or infarct.  Esequiel EKG was obtained which is interpretted me shows a sinus rhythm rate of 90 without evidence of obvious ST elevations or T wave inversions to indicate acute ischemia or infarct.  Did consider making the patient a stroke alert however given that he has unknown last known well time he would not benefit from TNK nor would he be a thrombectomy candidate.  However I did do a CT head and CTA.  They did not have any acute findings per radiology.  Patient did pass a swallow eval therefore we did give him an aspirin.  Family later stated that he has a history of seizures and he may have had a seizure.  This would further not make him a candidate for acute neurological interventions.  I believe the patient would benefit from inpatient admission for further evaluation and MRI and neurology consult.  We did check labs as well to make sure this was an  electrolyte abnormality he is mildly hyponatremic to 127.  He is also hyper calcemic at 11.  No known cancer history.  Did speak to medicine and the patient was accepted for admission  treatment:   Reevaluation:   Plan: Admit patient and family/friend/caregiver are in agreement with this plan.   Impression:   1.  Expressive aphasia  2.  Hyponatremia          History provided by:  EMS personnel  History limited by:  Mental status change          Patient History   Past Medical History:   Diagnosis Date    COPD (chronic obstructive pulmonary disease) (Multi)     Hypertension      No past surgical history on file.  No family history on file.  Social History     Tobacco Use    Smoking status: Every Day     Current packs/day: 1.00     Average packs/day: 1 pack/day for 55.0 years (55.0 ttl pk-yrs)     Types: Cigarettes    Smokeless tobacco: Never   Vaping Use    Vaping status: Never Used   Substance Use Topics    Alcohol use: Defer     Alcohol/week: 5.0 standard drinks of alcohol     Types: 5 Cans of beer per week    Drug use: Yes     Types: Marijuana       Physical Exam   ED Triage Vitals [04/06/25 1557]   Temperature Heart Rate Respirations BP   36.6 °C (97.9 °F) (!) 114 20 138/69      Pulse Ox Temp Source Heart Rate Source Patient Position   98 % Temporal Monitor Sitting      BP Location FiO2 (%)     Right arm --       Physical Exam      ED Course & MDM   Diagnoses as of 04/06/25 2049   Expressive aphasia   Hyponatremia                 No data recorded     [place] Coma Scale Score: 14 (04/06/25 1744 : Lucila Hinojosa, RN)       NIH Stroke Scale: 5 (04/06/25 1744 : Lucila Hinojosa, RN)                   Medical Decision Making      Procedure  Procedures     Jemma Bueno MD  04/06/25 2049       Jemma Bueno MD  04/06/25 2108

## 2025-04-07 ENCOUNTER — PHARMACY VISIT (OUTPATIENT)
Dept: PHARMACY | Facility: CLINIC | Age: 75
End: 2025-04-07
Payer: COMMERCIAL

## 2025-04-07 ENCOUNTER — APPOINTMENT (OUTPATIENT)
Dept: RADIOLOGY | Facility: HOSPITAL | Age: 75
DRG: 092 | End: 2025-04-07
Payer: MEDICARE

## 2025-04-07 ENCOUNTER — APPOINTMENT (OUTPATIENT)
Dept: NEUROLOGY | Facility: HOSPITAL | Age: 75
DRG: 092 | End: 2025-04-07
Payer: MEDICARE

## 2025-04-07 VITALS
DIASTOLIC BLOOD PRESSURE: 64 MMHG | HEIGHT: 64 IN | SYSTOLIC BLOOD PRESSURE: 110 MMHG | BODY MASS INDEX: 27.31 KG/M2 | OXYGEN SATURATION: 96 % | RESPIRATION RATE: 14 BRPM | TEMPERATURE: 97.9 F | HEART RATE: 60 BPM | WEIGHT: 160 LBS

## 2025-04-07 VITALS
TEMPERATURE: 97.9 F | DIASTOLIC BLOOD PRESSURE: 78 MMHG | BODY MASS INDEX: 27.31 KG/M2 | RESPIRATION RATE: 17 BRPM | HEART RATE: 86 BPM | SYSTOLIC BLOOD PRESSURE: 136 MMHG | HEIGHT: 64 IN | WEIGHT: 160 LBS | OXYGEN SATURATION: 96 %

## 2025-04-07 LAB
AMMONIA PLAS-SCNC: 26 UMOL/L (ref 16–53)
ATRIAL RATE: 104 BPM
ATRIAL RATE: 93 BPM
P AXIS: 59 DEGREES
P AXIS: 78 DEGREES
P OFFSET: 205 MS
P OFFSET: 207 MS
P ONSET: 137 MS
P ONSET: 139 MS
PR INTERVAL: 168 MS
PR INTERVAL: 170 MS
Q ONSET: 222 MS
Q ONSET: 223 MS
QRS COUNT: 15 BEATS
QRS COUNT: 18 BEATS
QRS DURATION: 86 MS
QRS DURATION: 90 MS
QT INTERVAL: 328 MS
QT INTERVAL: 358 MS
QTC CALCULATION(BAZETT): 431 MS
QTC CALCULATION(BAZETT): 445 MS
QTC FREDERICIA: 394 MS
QTC FREDERICIA: 414 MS
R AXIS: 77 DEGREES
R AXIS: 90 DEGREES
T AXIS: 70 DEGREES
T AXIS: 81 DEGREES
T OFFSET: 387 MS
T OFFSET: 401 MS
VENTRICULAR RATE: 104 BPM
VENTRICULAR RATE: 93 BPM

## 2025-04-07 PROCEDURE — 99222 1ST HOSP IP/OBS MODERATE 55: CPT | Performed by: STUDENT IN AN ORGANIZED HEALTH CARE EDUCATION/TRAINING PROGRAM

## 2025-04-07 PROCEDURE — RXMED WILLOW AMBULATORY MEDICATION CHARGE

## 2025-04-07 PROCEDURE — 2500000001 HC RX 250 WO HCPCS SELF ADMINISTERED DRUGS (ALT 637 FOR MEDICARE OP): Performed by: NURSE PRACTITIONER

## 2025-04-07 PROCEDURE — 95816 EEG AWAKE AND DROWSY: CPT | Performed by: STUDENT IN AN ORGANIZED HEALTH CARE EDUCATION/TRAINING PROGRAM

## 2025-04-07 PROCEDURE — 70551 MRI BRAIN STEM W/O DYE: CPT

## 2025-04-07 PROCEDURE — 36415 COLL VENOUS BLD VENIPUNCTURE: CPT | Performed by: NURSE PRACTITIONER

## 2025-04-07 PROCEDURE — 82140 ASSAY OF AMMONIA: CPT | Performed by: NURSE PRACTITIONER

## 2025-04-07 PROCEDURE — 2500000004 HC RX 250 GENERAL PHARMACY W/ HCPCS (ALT 636 FOR OP/ED): Performed by: NURSE PRACTITIONER

## 2025-04-07 PROCEDURE — 2500000001 HC RX 250 WO HCPCS SELF ADMINISTERED DRUGS (ALT 637 FOR MEDICARE OP): Performed by: STUDENT IN AN ORGANIZED HEALTH CARE EDUCATION/TRAINING PROGRAM

## 2025-04-07 PROCEDURE — 70551 MRI BRAIN STEM W/O DYE: CPT | Performed by: RADIOLOGY

## 2025-04-07 PROCEDURE — 99239 HOSP IP/OBS DSCHRG MGMT >30: CPT | Performed by: STUDENT IN AN ORGANIZED HEALTH CARE EDUCATION/TRAINING PROGRAM

## 2025-04-07 PROCEDURE — 95816 EEG AWAKE AND DROWSY: CPT

## 2025-04-07 RX ORDER — VALSARTAN 80 MG/1
80 TABLET ORAL EVERY EVENING
Status: DISCONTINUED | OUTPATIENT
Start: 2025-04-07 | End: 2025-04-07 | Stop reason: HOSPADM

## 2025-04-07 RX ORDER — LEVETIRACETAM 500 MG/1
750 TABLET ORAL 2 TIMES DAILY
Status: DISCONTINUED | OUTPATIENT
Start: 2025-04-07 | End: 2025-04-07 | Stop reason: HOSPADM

## 2025-04-07 RX ORDER — AMLODIPINE BESYLATE 5 MG/1
10 TABLET ORAL DAILY
Status: DISCONTINUED | OUTPATIENT
Start: 2025-04-08 | End: 2025-04-07 | Stop reason: HOSPADM

## 2025-04-07 RX ORDER — ACETAMINOPHEN 325 MG/1
650 TABLET ORAL EVERY 6 HOURS PRN
Status: DISCONTINUED | OUTPATIENT
Start: 2025-04-07 | End: 2025-04-07 | Stop reason: HOSPADM

## 2025-04-07 RX ORDER — LEVETIRACETAM 750 MG/1
750 TABLET ORAL 2 TIMES DAILY
Qty: 60 TABLET | Refills: 3 | Status: SHIPPED | OUTPATIENT
Start: 2025-04-07 | End: 2025-05-07

## 2025-04-07 RX ADMIN — LEVETIRACETAM 750 MG: 500 TABLET, FILM COATED ORAL at 15:24

## 2025-04-07 RX ADMIN — Medication 1 TABLET: at 08:08

## 2025-04-07 RX ADMIN — FOLIC ACID 1 MG: 1 TABLET ORAL at 08:08

## 2025-04-07 RX ADMIN — PHENOBARBITAL 64.8 MG: 32.4 TABLET ORAL at 08:08

## 2025-04-07 RX ADMIN — THIAMINE HYDROCHLORIDE 100 MG: 100 INJECTION, SOLUTION INTRAMUSCULAR; INTRAVENOUS at 08:09

## 2025-04-07 RX ADMIN — ASPIRIN 81 MG: 81 TABLET, CHEWABLE ORAL at 08:08

## 2025-04-07 RX ADMIN — HEPARIN SODIUM 5000 UNITS: 5000 INJECTION INTRAVENOUS; SUBCUTANEOUS at 07:54

## 2025-04-07 ASSESSMENT — PAIN SCALES - GENERAL
PAINLEVEL_OUTOF10: 0 - NO PAIN
PAINLEVEL_OUTOF10: 0 - NO PAIN

## 2025-04-07 ASSESSMENT — ACTIVITIES OF DAILY LIVING (ADL)
BEDSIDE_CLEANING: YES
TOILETING: INDEPENDENT
LACK_OF_TRANSPORTATION: NO
AMBULATION_ASSISTANCE: INDEPENDENT

## 2025-04-07 ASSESSMENT — LIFESTYLE VARIABLES
TOTAL SCORE: 2
ORIENTATION AND CLOUDING OF SENSORIUM: ORIENTED AND CAN DO SERIAL ADDITIONS
NAUSEA AND VOMITING: NO NAUSEA AND NO VOMITING
AUDITORY DISTURBANCES: NOT PRESENT
PAROXYSMAL SWEATS: NO SWEAT VISIBLE
ORIENTATION AND CLOUDING OF SENSORIUM: ORIENTED AND CAN DO SERIAL ADDITIONS
AGITATION: NORMAL ACTIVITY
PULSE: 58
TREMOR: 2
ANXIETY: NO ANXIETY, AT EASE
BLOOD PRESSURE: 112/58
TOTAL SCORE: 2
VISUAL DISTURBANCES: NOT PRESENT
HEADACHE, FULLNESS IN HEAD: NOT PRESENT
TREMOR: 2
HEADACHE, FULLNESS IN HEAD: NOT PRESENT
PAROXYSMAL SWEATS: NO SWEAT VISIBLE
ANXIETY: NO ANXIETY, AT EASE
AUDITORY DISTURBANCES: NOT PRESENT
NAUSEA AND VOMITING: NO NAUSEA AND NO VOMITING
AGITATION: NORMAL ACTIVITY
VISUAL DISTURBANCES: NOT PRESENT

## 2025-04-07 NOTE — CONSULTS
"Consults    History Of Present Illness  Elton Lilly is a 74 y.o. male with PMHx of HTN, COPD, HLD, EtOH use and prior traumatic bilateral temporal IPH (2022) presenting with transient episode of confusion and aphasia.    Around 1pm on 4/6/25, he started to feel \"woozy\", no longer able to concentrate to read. He was starting to feel better so he drove to his father-in-law's house. However, upon arrival, he was confused, unable to turn off the car. His father in law and son came out to see what was going on but he could not respond to them nor could he process what they were saying to him. EMS was called and he lost awareness at some point during transit to the hospital. Upon arrival to the hospital, ER documentation notes that he seemed confused with expressive aphasia but able to follow commands.     Attempted to call patient's son Bryan ([phone]) x [phone] for further information but unable to reach via phone.     During my evaluation today, he feels that he is fully back to his neurological baseline. He denies associated numbness/tingling, facial droop, weakness, dizziness or headache at the time of the event nor since then. No further events concerning for seizure. He denies history of nocturnal oral trauma, staring spells or febrile seizures. His last drink was 24 hours prior to event were he had 4 beers (he typically drinks 4 beer daily)    Per chart review, during admission for traumatic IPH in 2022, he episode of aphasia that improved with implementation of keppra. This event was not captured on EEG. EEG completed later during this hospitalization was unremarkable and keppra was discontinued after 7 days.     Data review:   Na 127 (over last year, ranges from 128-135), Cl 86  BP normotensive  CT neg, CTA head/neck unremarkable  MRI brain: no acute infarct; small foci of left temporal encephalomalacia   EEG routine: normal awake     Past Medical History  Past Medical History:   Diagnosis Date    COPD (chronic " "obstructive pulmonary disease) (Multi)     Hypertension      Surgical History  No past surgical history on file.  Social History  Social History     Tobacco Use    Smoking status: Every Day     Current packs/day: 1.00     Average packs/day: 1 pack/day for 55.0 years (55.0 ttl pk-yrs)     Types: Cigarettes    Smokeless tobacco: Never   Vaping Use    Vaping status: Never Used   Substance Use Topics    Alcohol use: Defer     Alcohol/week: 5.0 standard drinks of alcohol     Types: 5 Cans of beer per week    Drug use: Yes     Types: Marijuana     Allergies  Bee venom protein (honey bee)  (Not in a hospital admission)      Review of Systems  Neurological Exam  Physical Exam  Last Recorded Vitals  Blood pressure 159/82, pulse 77, temperature 36.4 °C (97.5 °F), temperature source Temporal, resp. rate 18, height 1.626 m (5' 4\"), weight 72.6 kg (160 lb), SpO2 96%.    Relevant Results        NIH Stroke Scale  1A. Level of Consciousness: Alert, Keenly Responsive  1B. Ask Month and Age: Both Questions Right  1C. Blink Eyes & Squeeze Hands: Performs Both Tasks  2. Best Gaze: Normal  3. Visual: No Visual Loss  4. Facial Palsy: Normal Symmetrical Movements  5A. Motor - Left Arm: No Drift  5B. Motor - Right Arm: No Drift  6A. Motor - Left Leg: No Drift  6B. Motor - Right Leg: No Drift  7. Limb Ataxia: Absent  8. Sensory Loss: Normal  9. Best Language: No Aphasia  10. Dysarthria: Normal  11. Extinction and Inattention: No Abnormality  NIH Stroke Scale: 0           [place] Coma Scale  Best Eye Response: Spontaneous  Best Verbal Response: Confused  Best Motor Response: Follows commands  [place] Coma Scale Score: 14               I have personally reviewed the following imaging results:   Imaging  EEG    Result Date: 4/7/2025  IMPRESSION This routine awake EEG is normal. No epileptiform discharges or lateralizing signs are seen. This report has been interpreted and electronically signed by    CT angio head and neck w and wo IV " contrast    Result Date: 4/6/2025  CTA neck   No evidence for significant stenosis of the cervical vessels. Atherosclerotic calcification at the right carotid bifurcation without stenosis by NASCET criteria.   CTA head:   No evidence for significant stenosis or large branch vessel cutoffs of the intracranial vessels.   CT head:   No acute intracranial hemorrhage or mass effect.   Volume loss. Nonspecific white matter hypodensity compatible with microangiopathy.   MACRO: None   Signed by: Daly Sampson 4/6/2025 5:16 PM Dictation workstation:   [id number]        Assessment/Plan   Assessment & Plan  Expressive aphasia      Episode of transient confusion and aphasia, now resolved. This is now his second event of this semiology (last event in 2022). He is now back to his neurological baseline. MRI brain wo revealed left temporal encephalomalacia from prior IPH, routine EEG unremarkable. No clear provoking trigger noted in metabolic workup. As now this is a second time stereotypical event concerning for seizure, benefits outweigh risks in starting AED.     Plan:  - start /750  - reviewed seizure precautions with patient, including avoiding high heights and swimming alone. He should be 6 months event free prior to returning to driving. Patient expressed understanding of this  - follow up with MARTHA Thompson at  [facility] [place] as outpatient  - no further inpatient neurological testing warranted at this time     I spent 60 minutes in the professional and overall care of this patient.      Floridalma Beaulieu, DO

## 2025-04-07 NOTE — DISCHARGE INSTRUCTIONS
As we discussed, at this time you cannot drive for the next 6 months or until cleared by your neurologist.  I also recommend avoiding climbing ladders/heights, swimming in a pool, operating heavy machinery at this time.    At this time we will discontinue her hydrochlorothiazide as it will be contributing to your low sodium.  I also recommend that you stop drinking alcohol at this time.

## 2025-04-07 NOTE — DISCHARGE SUMMARY
Medical [facility] Discharge Summary  DISCHARGE DIAGNOSIS     Transient aphasia/confusion   Hyponatremia  Alcohol use disorder    ISSUES REQUIRING FOLLOW-UP     Seizure medication tolerance    HOSPITAL COURSE AND DETAILS     This is a 74-year-old male with a significant past medical history of hypertension, COPD, daily alcohol use, previous bilateral intraparenchymal hemorrhage that presented from home with a chief complaints of a transient episode of confusion and aphasia.  Patient had a brief episode of feeling woozy lost concentration, unable to get out of the car and family near him was unable to understand what was going on as well.    Patient was seen by neurology during hospitalization.  MRI of the brain without contrast with no acute infarct, intracranial mass effect or midline shift.  Concerns for small encephalomalacia and gliosis likely in setting of old infarctions that were present on a previous MRI.  Routine EEG also unremarkable was presumed to be in setting of a seizure therefore patient was started on Keppra.    Patient provided driving restrictions as well as other seizure related precautions for the next 6 months.  Neurology follow-up to discuss hospitalization in 1 month, [facility] 4 [facility].  Will continue Keppra 750 mg twice daily.    Also found to have hyponatremia likely in setting of alcohol and hydrochlorothiazide use.  Hydrochlorothiazide has been discontinued on discharge.  I did speak with patient and recommended complete alcohol cessation at this time.     Total time spent on discharge: >30min      ---Of note, this documentation is completed using the Dragon Dictation system (voice recognition software). There may be spelling and/or grammatical errors that were not corrected prior to final submission.---    David Dooley MD    DISCHARGE PHYSICAL EXAM     Last Recorded Vitals:  Vitals:    04/07/25 1001 04/07/25 1002 04/07/25 1318 04/07/25 1415   BP: 168/89  159/82 147/84 136/78   BP Location:   Left arm    Patient Position: Sitting Standing Sitting    Pulse: 72 77 71 86   Resp: 18 18 17 17   Temp:   36.6 °C (97.9 °F) 36.6 °C (97.9 °F)   TempSrc:   Temporal    SpO2: 97% 96% 94% 96%   Weight:       Height:         Physical Exam  Vitals reviewed.   Constitutional:       General: He is not in acute distress.     Appearance: Normal appearance. He is not ill-appearing.   HENT:      Head: Normocephalic and atraumatic.      Nose: Nose normal.   Eyes:      Extraocular Movements: Extraocular movements intact.      Conjunctiva/sclera: Conjunctivae normal.   Cardiovascular:      Rate and Rhythm: Normal rate and regular rhythm.      Pulses: Normal pulses.      Heart sounds: Normal heart sounds.   Pulmonary:      Effort: Pulmonary effort is normal.      Breath sounds: Normal breath sounds.   Abdominal:      General: Bowel sounds are normal. There is no distension.      Palpations: Abdomen is soft.      Tenderness: There is no abdominal tenderness. There is no guarding.   Musculoskeletal:         General: No swelling or tenderness. Normal range of motion.      Cervical back: Normal range of motion and neck supple.   Neurological:      General: No focal deficit present.      Mental Status: He is alert and oriented to person, place, and time. Mental status is at baseline.   Psychiatric:         Mood and Affect: Mood normal.         Behavior: Behavior normal.       DISCHARGE MEDICATIONS        Your medication list        START taking these medications        Instructions Last Dose Given Next Dose Due   levETIRAcetam 750 mg tablet  Commonly known as: Keppra      Take 1 tablet (750 mg) by mouth 2 times a day.              CONTINUE taking these medications        Instructions Last Dose Given Next Dose Due   acetaminophen 325 mg tablet  Commonly known as: Tylenol           albuterol 90 mcg/actuation inhaler           amLODIPine 10 mg tablet  Commonly known as: Norvasc           atorvastatin  40 mg tablet  Commonly known as: Lipitor           oxygen gas therapy  Commonly known as: O2      Inhale 1 each continuously.       STIOLTO RESPIMAT INHL           valsartan 80 mg tablet  Commonly known as: Diovan                  STOP taking these medications      hydroCHLOROthiazide 25 mg tablet  Commonly known as: HYDRODiuril               ASK your doctor about these medications        Instructions Last Dose Given Next Dose Due   tiotropium 2.5 mcg/actuation inhaler  Commonly known as: Spiriva Respimat      Inhale 2 puffs once daily.                 Where to Get Your Medications        These medications were sent to [facility]  [address] [address]      Hours: 9 AM to 5:30 PM Mon-Fri, 9 AM to 1 PM Saturday Phone: [phone]   levETIRAcetam 750 mg tablet           OUTPATIENT FOLLOW-UP     No future appointments.

## 2025-04-07 NOTE — SIGNIFICANT EVENT
Nursing staff that patient slipped and fell in the ED while stepping off the MRI cart.    On my examination, patient states that he was wearing his home compression socks and slipped landing on the ground.  Did not hit his head, no loss of consciousness, lightheadedness, or dizziness dizziness prior to this fall.  Nursing staff at bedside during my examination.  Advised patient keep on his yellow [facility] hospital socks at this time.    Further information to follow in my note later today.    David Dooley MD

## 2025-05-22 ENCOUNTER — APPOINTMENT (OUTPATIENT)
Dept: NEUROLOGY | Facility: CLINIC | Age: 75
End: 2025-05-22
Payer: OTHER GOVERNMENT